# Patient Record
Sex: MALE | Race: WHITE | NOT HISPANIC OR LATINO
[De-identification: names, ages, dates, MRNs, and addresses within clinical notes are randomized per-mention and may not be internally consistent; named-entity substitution may affect disease eponyms.]

---

## 2022-04-05 ENCOUNTER — APPOINTMENT (OUTPATIENT)
Dept: ORTHOPEDIC SURGERY | Facility: CLINIC | Age: 62
End: 2022-04-05
Payer: OTHER MISCELLANEOUS

## 2022-04-05 PROBLEM — Z00.00 ENCOUNTER FOR PREVENTIVE HEALTH EXAMINATION: Status: ACTIVE | Noted: 2022-04-05

## 2022-04-05 PROCEDURE — 99072 ADDL SUPL MATRL&STAF TM PHE: CPT

## 2022-04-05 PROCEDURE — 20611 DRAIN/INJ JOINT/BURSA W/US: CPT | Mod: LT

## 2022-04-05 PROCEDURE — 99212 OFFICE O/P EST SF 10 MIN: CPT | Mod: PA,25

## 2022-04-05 NOTE — PROCEDURE
[Large Joint Injection] : Large joint injection [Left] : of the left [Knee] : knee [X-ray evidence of Osteoarthritis on this or prior visit] : x-ray evidence of Osteoarthritis on this or prior visit [Repeat series performed] : repeat series performed [Betadine] : betadine [Ethyl Chloride sprayed topically] : ethyl chloride sprayed topically [Sterile technique used] : sterile technique used [Orthovisc] : Orthovisc [#4] : series #4 [Call if redness, pain or fever occur] : call if redness, pain or fever occur [Apply ice for 15min out of every hour for the next 12-24 hours as tolerated] : apply ice for 15 minutes out of every hour for the next 12-24 hours as tolerated [Previous OTC use and PT nontherapeutic] : patient has tried OTC's including aspirin, Ibuprofen, Aleve, etc or prescription NSAIDS, and/or exercises at home and/or physical therapy without satisfactory response [Patient had decreased mobility in the joint] : patient had decreased mobility in the joint [Risks, benefits, alternatives discussed / Verbal consent obtained] : the risks benefits, and alternatives have been discussed, and verbal consent was obtained [Morbid obesity] : morbid obesity [All ultrasound images have been permanently captured and stored accordingly in our picture archiving and communication system] : All ultrasound images have been permanently captured and stored accordingly in our picture archiving and communication system [Visualization of the needle and placement of injection was performed without complication] : visualization of the needle and placement of injection was performed without complication

## 2022-04-05 NOTE — ASSESSMENT
[FreeTextEntry1] : Previous doc:\par Adv OA left knee. Failed conservative treatments. BMI 53 today (340 lbs) - needs to be < 300 lbs (BMI < 40 would be 250). Had lap band in 2003 and is open to exploring gastric sleeve.\par 4/30/19: Continued pain - working on getting date for bariatrics. For now cont duexis prn and will try tramadol.\par Cortisone inj in the past have only helped for a few days at a time.\par 5/28/19: Doing well with weight loss and planning for removal of gastric band and may need 2 stage for sleeve.\par 6/25/19: Stable for now - planning for bariatrics end of next month - he will be unable to work for a while due to this.\par 9/24/19: Cont pain - working on weight loss (293 lbs today). Injections in the past only a few days relief so will hold on this.\par 12/17/19: Worsening pain. Weight down to 280s now and BMI is < 45 - will get auth for TKA. Has psoriasis but never affecting left knee.\par 1/14/20: Cont difficulty and waiting for left TKA auth.\par 2/11/20: No change - TKA was denied but has appeal hearing in 2 weeks. Cont weight loss.\par 5/13/20: continues to work on weight loss. His Pain and loss of of function continues to progress. He has to take pain medication on a daily basis due how severe his pain is. At this time i believe the benefits outweigh the risks. He will continue on weight loss and our goal of TKA. Refilled his duexis.\par 8/11/20: No change - has deposition scheduled in Aug.\par 9/22/20: No change - currently 330 lbs and discussed need to be < 300 lbs for TKA. Awaiting auth. At one point was 280 lbs but gained some back during pandemic.\par 11/3/20: TKA authorized but unable to proceed as he is struggling with weight loss. BMI too high to proceed with\par surgery and needs to be < 300, ideally 280s to have TKA.\par 12/15/20: Continued pain - No change in weight. Will continue to work on weight loss and continue with home\par exercises. Cortisone injection today.\par 3/16/21: Still with significant pain - weight loss has not progressed and he has been around 345 the past few months -\par no overall loss from bariatrics. Not a TKA candidate at this time due to weight. Will try orthovisc again.\par 4/16/21: Inj tolerated well - asp 5cc.\par 4/27/21: Inj tolerated well.\par 5/3/21: Inj tolerated well.\par 5/11/21: Inj tolerated well.\par 6/8/21 he has slow wt loss down to 337 - dealing with neuropathy and DM -some relief with Inj and he is concerned he may be disabled\par y \par 7/20/21 he is unable to retrun to work due to pain but did have some relief with Knee inj - cont o struggle with wt loss\par 10/19/21: Symptoms unchanged, he has responded to orthovisc and will make another request as he can restart series early-mid November 2021. Weight loss discussed. He will continue OOW.\par 12/21/21: No change in symptoms. Will discuss "granted w prejudice" with WC as clarification is needed of what this means. f/up in 4 weeks. He will continue OOW\par 1/18/22: No change - will start HA injections when aurthorized. Only has granted w prejudice at this time.\par 3/1/22: No auth required to start orthovisc - he is going away next week and would like to start the series when he returns.\par 3/15/22: Inj tolerated well.\par \par 4/5/22: Inj tolerated well, reeval in 6 weeks.

## 2022-04-05 NOTE — WORK
[Total] : total [Does not reveal pre-existing condition(s) that may affect treatment/prognosis] : does not reveal pre-existing condition(s) that may affect treatment/prognosis [Cannot return to work because ________] : cannot return to work because [unfilled] [Unknown at this time] : : unknown at this time [Patient] : patient [No Rx restrictions] : No Rx restrictions. [I provided the services listed above] :  I provided the services listed above. [FreeTextEntry1] : guarded

## 2022-04-05 NOTE — HISTORY OF PRESENT ILLNESS
[4] : 4 [Dull/Aching] : dull/aching [de-identified] : 4/5/22: Orthovisc #4 left knee, some improvement overall.\par \par Previous doc:\par WC 2/27/13.\par Left knee pain for several years, fell at work sustaining meniscus tear. Prior to this had scope in 2007 with some relief.\par Over the years progressive worsening pain. had success in the past with HA and cortisone injections but this past year\par had less relief.. Cortisone inj 2 weeks ago only helped a few days. Lap band 2003 with 40 lb weight loss. Mult diets in\par the past always with  lb loss but then gains back.\par 4/30/19: Continued pain, started seeing bariatric surgeon and is on meal replacement plan. Down to 334 lbs.\par 5/28/19: Cont pain - lost another 10 lbs since last visit. Seeing bariatrics - may need 2 stage procedure to remove\par band and then get sleeve.\par 6/25/19: Cont pain - had to stop NSAIDs because of upcoming bariatric surgery. Taking Tylenol currently with very little\par relief. Surgery tentatively scheduled 7/24.\par 9/24/19: Cont pain. Had bariatic surgery in July.\par 12/9/19 Orthovisc #2 right knee.\par 12/17/19: Worsening pain - down to 287 lbs.\par 1/14/20: Cont pain, no change in weight.\par 2/11/20: TKA was denied, has hearing in 2 weeks. No change in weight recently.\par 5/13/20: Patient continues to having worsening pain. Has difficulty sleeping. His pain affects his daily life. Reports no\par change in weight since last visit. Would like a refill on his Duexis. Patient is not currently working due to COVID-19. \par 8/11/20: Cont pain, awaiting auth for TKA but deposition was rescheduled. now for 8/31?\par 9/22/20: No change - waiting for TKA auth.\par 11/3/20: Cont pain, TKA was authorized but struggling with weight loss.\par 12/15/20: Worsening left knee pain. Has been unsuccessful with weight loss. Started Ozympic this week.\par 3/16/21: Cont knee pain. Cortisone inj in Dec helped for 1-2 weeks.\par 4/16/21: Orthovisc #1 left knee.\par 4/27/21: Orthovisc #2 left knee.\par 5/4/21: Orthovisc #3 left knee.\par 5/11/21: Orthovisc #4 left knee.\par 6/8/21: Since last injection, he has had dull pain in left knee and does not have any clicking in left knee. Has been able\par to sleep at night and walk smoothly. knee still occ gives out on him \par 7/20 cont to have sig pain but inj helped - all medial pain -- \par 10/19/21: f/u LT knee, symptoms persist. No significant change. HA in the past with relief.\par 12/21/21: No change in symptoms from last visit.\par 1/18/22: Cont pain, no auth yet for HA injections.\par 3/1/22: Cont pain.\par 3/15/22: Orthovisc #1 left knee. [FreeTextEntry1] : left knee [de-identified] : none

## 2022-04-13 ENCOUNTER — APPOINTMENT (OUTPATIENT)
Dept: ORTHOPEDIC SURGERY | Facility: CLINIC | Age: 62
End: 2022-04-13
Payer: OTHER MISCELLANEOUS

## 2022-04-13 VITALS — HEIGHT: 67 IN | BODY MASS INDEX: 49.44 KG/M2 | WEIGHT: 315 LBS

## 2022-04-13 PROCEDURE — 99072 ADDL SUPL MATRL&STAF TM PHE: CPT

## 2022-04-13 PROCEDURE — 99213 OFFICE O/P EST LOW 20 MIN: CPT | Mod: 25

## 2022-04-13 PROCEDURE — 20611 DRAIN/INJ JOINT/BURSA W/US: CPT | Mod: RT

## 2022-04-13 NOTE — HISTORY OF PRESENT ILLNESS
[1] : 1 [Orthovisc] : Orthovisc [de-identified] : Patient returns today to start ORthovisc series for the right knee, history of post trauamatic OA. He has completed visco in the past with some help. PAin with walking, stairs, stiffness getting up from a seated position.  [] : This patient has had an injection before: no [de-identified] : right knee

## 2022-04-13 NOTE — WORK
[Total] : total [Does not reveal pre-existing condition(s) that may affect treatment/prognosis] : does not reveal pre-existing condition(s) that may affect treatment/prognosis [Cannot return to work because ________] : cannot return to work because [unfilled] [No] : No [No Rx restrictions] : No Rx restrictions. [I provided the services listed above] :  I provided the services listed above. [FreeTextEntry1] : guarded

## 2022-04-13 NOTE — PROCEDURE
[FreeTextEntry3] : Orthovisc (Large Joint) with Ultrasound Guidance\par Viscosupplementation Injection: X-ray evidence of Osteoarthritis on this or prior visit and Patient has tried OTC's including aspirin, Ibuprofen, Aleve etc or prescription NSAIDS, and/or exercises at home and/ or physical therapy without satisfactory response. \par An injection of Orthovisc 2ml #1 was injected into the right knee(s). after verbal consent using sterile technique. The risks, benefits, and alternatives to Viscosupplementation injection were explained in full to the patient. Risks outlined include but are not limited to infection, sepsis, bleeding, scarring, skin discoloration, temporary increase in pain, syncopal episode, failure to resolve symptoms, allergic reaction, and symptom recurrence. Signs and symptoms of infection reviewed and patient advised to call immediately for redness, fevers, and/or chills. Patient understood the risks. All questions were answered. After discussion of options, patient requested Viscosupplementation. Oral informed consent was obtained and sterile prep was done of the injection site. Sterile technique was used without complications. The patient tolerated the procedure well. Ice tonight to the injection site. \par \par Ultrasound Guidance was used for the following reasons: altered anatomic landmarks because of erosive arthritis. \par \par Ultrasound guided injection was performed of the knee, visualization of the needle and placement of injection was performed without complication. \par

## 2022-04-13 NOTE — DISCUSSION/SUMMARY
[Medication Risks Reviewed] : Medication risks reviewed [de-identified] : Tolerated injection well.\par Rest, ice, activity modification reviewed.\par REturn next week.

## 2022-04-13 NOTE — PHYSICAL EXAM
[Right] : right knee [5___] : hamstring 5[unfilled]/5 [] : no calf tenderness [TWNoteComboBox7] : flexion 120 degrees [de-identified] : extension 0 degrees

## 2022-04-14 ENCOUNTER — APPOINTMENT (OUTPATIENT)
Dept: ORTHOPEDIC SURGERY | Facility: CLINIC | Age: 62
End: 2022-04-14
Payer: OTHER MISCELLANEOUS

## 2022-04-14 VITALS — WEIGHT: 315 LBS | HEIGHT: 67 IN | BODY MASS INDEX: 49.44 KG/M2

## 2022-04-14 PROCEDURE — 99072 ADDL SUPL MATRL&STAF TM PHE: CPT

## 2022-04-14 PROCEDURE — 99214 OFFICE O/P EST MOD 30 MIN: CPT

## 2022-04-14 NOTE — DISCUSSION/SUMMARY
[Medication Risks Reviewed] : Medication risks reviewed [de-identified] : Please authorize PT program for the right shoulder.\par frequency of CSI discussed.\par

## 2022-04-14 NOTE — HISTORY OF PRESENT ILLNESS
[7] : 7 [0] : 0 [de-identified] : DOA 1/15/18\par \par pt is here today for WC follow up for the right shoulder. pt states that his level of pain is the same as last visit. pt still is having issues with lifting his arm up, behind back, sleeping. Pain affects ADL's.  [FreeTextEntry1] : right shoulder [de-identified] : home exercises

## 2022-04-14 NOTE — PHYSICAL EXAM
[Right] : right shoulder [Sitting] : sitting [Mild] : mild [] : decreased sensation around incision [TWNoteComboBox7] : active forward flexion 140 degrees [TWNoteComboBox6] : internal rotation L5 [de-identified] : external rotation 50 degrees

## 2022-04-14 NOTE — WORK
[Total] : total [Does not reveal pre-existing condition(s) that may affect treatment/prognosis] : does not reveal pre-existing condition(s) that may affect treatment/prognosis [Cannot return to work because ________] : cannot return to work because [unfilled] [Unknown at this time] : : unknown at this time [Patient] : patient [No Rx restrictions] : No Rx restrictions. [I provided the services listed above] :  I provided the services listed above. [FreeTextEntry1] : poor

## 2022-04-20 ENCOUNTER — APPOINTMENT (OUTPATIENT)
Dept: ORTHOPEDIC SURGERY | Facility: CLINIC | Age: 62
End: 2022-04-20
Payer: OTHER MISCELLANEOUS

## 2022-04-20 ENCOUNTER — APPOINTMENT (OUTPATIENT)
Dept: ORTHOPEDIC SURGERY | Facility: CLINIC | Age: 62
End: 2022-04-20

## 2022-04-20 VITALS — BODY MASS INDEX: 49.44 KG/M2 | HEIGHT: 67 IN | WEIGHT: 315 LBS

## 2022-04-20 PROCEDURE — 99072 ADDL SUPL MATRL&STAF TM PHE: CPT

## 2022-04-20 PROCEDURE — 99213 OFFICE O/P EST LOW 20 MIN: CPT | Mod: 25

## 2022-04-20 PROCEDURE — 20611 DRAIN/INJ JOINT/BURSA W/US: CPT | Mod: RT

## 2022-04-20 NOTE — DISCUSSION/SUMMARY
[Medication Risks Reviewed] : Medication risks reviewed [de-identified] : Patient allowed to gently start resuming activities. \par Discussed change to medication prescription and usage. \par Activity modification as needed\par

## 2022-04-20 NOTE — HISTORY OF PRESENT ILLNESS
[2] : 2 [Orthovisc] : Orthovisc [de-identified] : pt is here today to continue his gel injection series #2 to the right knee. [] : no [de-identified] : 04/13/2022 [de-identified] : right knee

## 2022-04-20 NOTE — PHYSICAL EXAM
[Right] : right knee [TWNoteComboBox6] : internal rotation L5 [de-identified] : external rotation 50 degrees [] : no ecchymosis [TWNoteComboBox7] : flexion 120 degrees

## 2022-04-27 ENCOUNTER — APPOINTMENT (OUTPATIENT)
Dept: ORTHOPEDIC SURGERY | Facility: CLINIC | Age: 62
End: 2022-04-27
Payer: OTHER MISCELLANEOUS

## 2022-04-27 VITALS — BODY MASS INDEX: 49.44 KG/M2 | HEIGHT: 67 IN | WEIGHT: 315 LBS

## 2022-04-27 PROCEDURE — 20611 DRAIN/INJ JOINT/BURSA W/US: CPT | Mod: RT

## 2022-04-27 PROCEDURE — 99072 ADDL SUPL MATRL&STAF TM PHE: CPT

## 2022-04-27 PROCEDURE — 99213 OFFICE O/P EST LOW 20 MIN: CPT | Mod: 25

## 2022-04-27 NOTE — DISCUSSION/SUMMARY
[Medication Risks Reviewed] : Medication risks reviewed [de-identified] : Patient allowed to gently start resuming activities. \par Discussed change to medication prescription and usage. \par Activity modification as needed\par

## 2022-04-27 NOTE — HISTORY OF PRESENT ILLNESS
[Orthovisc] : Orthovisc [3] : 3 [de-identified] : pt is here today to continue his gel injection series #3 to the right knee. [] : no [de-identified] : 04/20/2022 [de-identified] : right knee

## 2022-04-27 NOTE — PROCEDURE
[FreeTextEntry3] : Orthovisc (Large Joint) with Ultrasound Guidance\par Viscosupplementation Injection: X-ray evidence of Osteoarthritis on this or prior visit and Patient has tried OTC's including aspirin, Ibuprofen, Aleve etc or prescription NSAIDS, and/or exercises at home and/ or physical therapy without satisfactory response. \par An injection of Orthovisc 2ml #3 was injected into the right knee(s). after verbal consent using sterile technique. The risks, benefits, and alternatives to Viscosupplementation injection were explained in full to the patient. Risks outlined include but are not limited to infection, sepsis, bleeding, scarring, skin discoloration, temporary increase in pain, syncopal episode, failure to resolve symptoms, allergic reaction, and symptom recurrence. Signs and symptoms of infection reviewed and patient advised to call immediately for redness, fevers, and/or chills. Patient understood the risks. All questions were answered. After discussion of options, patient requested Viscosupplementation. Oral informed consent was obtained and sterile prep was done of the injection site. Sterile technique was used without complications. The patient tolerated the procedure well. Ice tonight to the injection site. \par \par Ultrasound Guidance was used for the following reasons: altered anatomic landmarks because of erosive arthritis. \par \par Ultrasound guided injection was performed of the knee, visualization of the needle and placement of injection was performed without complication. \par

## 2022-05-04 ENCOUNTER — APPOINTMENT (OUTPATIENT)
Dept: ORTHOPEDIC SURGERY | Facility: CLINIC | Age: 62
End: 2022-05-04
Payer: OTHER MISCELLANEOUS

## 2022-05-04 VITALS — HEIGHT: 67 IN | BODY MASS INDEX: 49.44 KG/M2 | WEIGHT: 315 LBS

## 2022-05-04 PROCEDURE — 99213 OFFICE O/P EST LOW 20 MIN: CPT | Mod: 25

## 2022-05-04 PROCEDURE — 99072 ADDL SUPL MATRL&STAF TM PHE: CPT

## 2022-05-04 PROCEDURE — 20611 DRAIN/INJ JOINT/BURSA W/US: CPT

## 2022-05-04 NOTE — PROCEDURE
[FreeTextEntry3] : Orthovisc (Large Joint) with Ultrasound Guidance\par Viscosupplementation Injection: X-ray evidence of Osteoarthritis on this or prior visit and Patient has tried OTC's including aspirin, Ibuprofen, Aleve etc or prescription NSAIDS, and/or exercises at home and/ or physical therapy without satisfactory response. \par An injection of Orthovisc 2ml #4 was injected into the right knee(s). after verbal consent using sterile technique. The risks, benefits, and alternatives to Viscosupplementation injection were explained in full to the patient. Risks outlined include but are not limited to infection, sepsis, bleeding, scarring, skin discoloration, temporary increase in pain, syncopal episode, failure to resolve symptoms, allergic reaction, and symptom recurrence. Signs and symptoms of infection reviewed and patient advised to call immediately for redness, fevers, and/or chills. Patient understood the risks. All questions were answered. After discussion of options, patient requested Viscosupplementation. Oral informed consent was obtained and sterile prep was done of the injection site. Sterile technique was used without complications. The patient tolerated the procedure well. Ice tonight to the injection site. \par \par Ultrasound Guidance was used for the following reasons: altered anatomic landmarks because of erosive arthritis. \par \par Ultrasound guided injection was performed of the knee, visualization of the needle and placement of injection was performed without complication. \par

## 2022-05-04 NOTE — DISCUSSION/SUMMARY
[Medication Risks Reviewed] : Medication risks reviewed [de-identified] : Patient allowed to gently start resuming activities. \par Discussed change to medication prescription and usage. \par Activity modification as needed\par

## 2022-05-04 NOTE — HISTORY OF PRESENT ILLNESS
[Orthovisc] : Orthovisc [de-identified] : pt is here today to continue his gel injection series #4 to the right knee. [3] : 3 [] : no [de-identified] : 04/20/2022 [de-identified] : right knee

## 2022-05-04 NOTE — PHYSICAL EXAM
[Right] : right knee [] : anterior tenderness [FreeTextEntry8] : sl [TWNoteComboBox7] : flexion 120 degrees

## 2022-05-17 ENCOUNTER — APPOINTMENT (OUTPATIENT)
Dept: ORTHOPEDIC SURGERY | Facility: CLINIC | Age: 62
End: 2022-05-17
Payer: OTHER MISCELLANEOUS

## 2022-05-17 VITALS — HEIGHT: 67 IN | BODY MASS INDEX: 49.44 KG/M2 | WEIGHT: 315 LBS

## 2022-05-17 DIAGNOSIS — E78.00 PURE HYPERCHOLESTEROLEMIA, UNSPECIFIED: ICD-10-CM

## 2022-05-17 DIAGNOSIS — E11.9 TYPE 2 DIABETES MELLITUS W/OUT COMPLICATIONS: ICD-10-CM

## 2022-05-17 PROCEDURE — 20611 DRAIN/INJ JOINT/BURSA W/US: CPT | Mod: LT

## 2022-05-17 PROCEDURE — 99072 ADDL SUPL MATRL&STAF TM PHE: CPT

## 2022-05-17 PROCEDURE — 99214 OFFICE O/P EST MOD 30 MIN: CPT | Mod: 25

## 2022-05-17 PROCEDURE — J3490M: CUSTOM

## 2022-05-17 NOTE — ASSESSMENT
[FreeTextEntry1] : Previous doc:\par Adv OA left knee. Failed conservative treatments. BMI 53 today (340 lbs) - needs to be < 300 lbs (BMI < 40 would be 250). Had lap band in 2003 and is open to exploring gastric sleeve.\par 4/30/19: Continued pain - working on getting date for bariatrics. For now cont duexis prn and will try tramadol.\par Cortisone inj in the past have only helped for a few days at a time.\par 5/28/19: Doing well with weight loss and planning for removal of gastric band and may need 2 stage for sleeve.\par 6/25/19: Stable for now - planning for bariatrics end of next month - he will be unable to work for a while due to this.\par 9/24/19: Cont pain - working on weight loss (293 lbs today). Injections in the past only a few days relief so will hold on this.\par 12/17/19: Worsening pain. Weight down to 280s now and BMI is < 45 - will get auth for TKA. Has psoriasis but never affecting left knee.\par 1/14/20: Cont difficulty and waiting for left TKA auth.\par 2/11/20: No change - TKA was denied but has appeal hearing in 2 weeks. Cont weight loss.\par 5/13/20: continues to work on weight loss. His Pain and loss of of function continues to progress. He has to take pain medication on a daily basis due how severe his pain is. At this time i believe the benefits outweigh the risks. He will continue on weight loss and our goal of TKA. Refilled his duexis.\par 8/11/20: No change - has deposition scheduled in Aug.\par 9/22/20: No change - currently 330 lbs and discussed need to be < 300 lbs for TKA. Awaiting auth. At one point was 280 lbs but gained some back during pandemic.\par 11/3/20: TKA authorized but unable to proceed as he is struggling with weight loss. BMI too high to proceed with\par surgery and needs to be < 300, ideally 280s to have TKA.\par 12/15/20: Continued pain - No change in weight. Will continue to work on weight loss and continue with home\par exercises. Cortisone injection today.\par 3/16/21: Still with significant pain - weight loss has not progressed and he has been around 345 the past few months -\par no overall loss from bariatrics. Not a TKA candidate at this time due to weight. Will try orthovisc again.\par 4/16/21: Inj tolerated well - asp 5cc.\par 4/27/21: Inj tolerated well.\par 5/3/21: Inj tolerated well.\par 5/11/21: Inj tolerated well.\par 6/8/21 he has slow wt loss down to 337 - dealing with neuropathy and DM -some relief with Inj and he is concerned he may be disabled\par y \par 7/20/21 he is unable to retrun to work due to pain but did have some relief with Knee inj - cont o struggle with wt loss\par 10/19/21: Symptoms unchanged, he has responded to orthovisc and will make another request as he can restart series early-mid November 2021. Weight loss discussed. He will continue OOW.\par 12/21/21: No change in symptoms. Will discuss "granted w prejudice" with WC as clarification is needed of what this means. f/up in 4 weeks. He will continue OOW\par 1/18/22: No change - will start HA injections when aurthorized. Only has granted w prejudice at this time.\par 3/1/22: No auth required to start orthovisc - he is going away next week and would like to start the series when he returns.\par 3/15/22: Inj tolerated well.\par 4/5/22: Inj tolerated well, reeval in 6 weeks.\par \par 5/17/22: HA injections helped but still with significant pain levels - cortisone inj today tolerated well.  He is very frustrated with this situation and that he has had such difficulty with weight loss.

## 2022-05-17 NOTE — HISTORY OF PRESENT ILLNESS
[Sudden] : sudden [8] : 8 [4] : 4 [Dull/Aching] : dull/aching [Localized] : localized [Sharp] : sharp [Not working due to injury] : Work status: not working due to injury [] : Post Surgical Visit: no [FreeTextEntry1] : L Knee [FreeTextEntry3] : N/A Chronic  [FreeTextEntry5] : pt is a 60 y/o male in for eval of the L Knee pt states NKI pt states pain is chronic pt states wthis is WC DOI 2/27/13 [de-identified] : Orthovisc Series Completed 4/5/22 [de-identified] :   [de-identified] : 5/17/22: HA injections helped but still has pain.\par \par Previous doc:\par WC 2/27/13.\par Left knee pain for several years, fell at work sustaining meniscus tear. Prior to this had scope in 2007 with some relief.\par Over the years progressive worsening pain. had success in the past with HA and cortisone injections but this past year\par had less relief.. Cortisone inj 2 weeks ago only helped a few days. Lap band 2003 with 40 lb weight loss. Mult diets in\par the past always with  lb loss but then gains back.\par 4/30/19: Continued pain, started seeing bariatric surgeon and is on meal replacement plan. Down to 334 lbs.\par 5/28/19: Cont pain - lost another 10 lbs since last visit. Seeing bariatrics - may need 2 stage procedure to remove\par band and then get sleeve.\par 6/25/19: Cont pain - had to stop NSAIDs because of upcoming bariatric surgery. Taking Tylenol currently with very little\par relief. Surgery tentatively scheduled 7/24.\par 9/24/19: Cont pain. Had bariatic surgery in July.\par 12/9/19 Orthovisc #2 right knee.\par 12/17/19: Worsening pain - down to 287 lbs.\par 1/14/20: Cont pain, no change in weight.\par 2/11/20: TKA was denied, has hearing in 2 weeks. No change in weight recently.\par 5/13/20: Patient continues to having worsening pain. Has difficulty sleeping. His pain affects his daily life. Reports no\par change in weight since last visit. Would like a refill on his Duexis. Patient is not currently working due to COVID-19. \par 8/11/20: Cont pain, awaiting auth for TKA but deposition was rescheduled. now for 8/31?\par 9/22/20: No change - waiting for TKA auth.\par 11/3/20: Cont pain, TKA was authorized but struggling with weight loss.\par 12/15/20: Worsening left knee pain. Has been unsuccessful with weight loss. Started Ozympic this week.\par 3/16/21: Cont knee pain. Cortisone inj in Dec helped for 1-2 weeks.\par 4/16/21: Orthovisc #1 left knee.\par 4/27/21: Orthovisc #2 left knee.\par 5/4/21: Orthovisc #3 left knee.\par 5/11/21: Orthovisc #4 left knee.\par 6/8/21: Since last injection, he has had dull pain in left knee and does not have any clicking in left knee. Has been able\par to sleep at night and walk smoothly. knee still occ gives out on him \par 7/20 cont to have sig pain but inj helped - all medial pain -- \par 10/19/21: f/u LT knee, symptoms persist. No significant change. HA in the past with relief.\par 12/21/21: No change in symptoms from last visit.\par 1/18/22: Cont pain, no auth yet for HA injections.\par 3/1/22: Cont pain.\par 3/15/22: Orthovisc #1 left knee.\par 4/5/22: Orthovisc #4 left knee, some improvement overall.

## 2022-05-17 NOTE — IMAGING
[de-identified] : Left knee:\par pain med and lateral joint line \par Crepitus\par NIVI\par Strenth 5/5\par Pulses +2 DP/PT\par Decreased ROM -\par

## 2022-05-17 NOTE — DISCUSSION/SUMMARY
[de-identified] : The patient was advised of the diagnosis.  The natural history of the pathology was explained in full to the patient in layman's terms. All questions were answered.  The risks and benefits of surgical and non-surgical treatment alternatives were explained in full to the patient.\par

## 2022-05-17 NOTE — PROCEDURE
[FreeTextEntry3] : Large joint injection was performed on the _____ knee. The indication for this procedure was pain, inflammation, and x-ray evidence of Osteoarthritis on this or prior visit. The site was prepped with betadine, ethyl chloride sprayed topically, and sterile technique used. An injection of Lidocaine 3cc of 1% , Bupivacaine (Marcaine) 5cc of 0.25% , Methylprednisolone (Depomedrol) cc of 80 mg was used. Patient was advised to call if redness, pain, or fever occur, apply ice for 15 minutes out of every hour for the next 12-24 hours as tolerated and patient was advised to rest the joint(s) for days.\par Patient has tried OTC's including aspirin, Ibuprofen, Aleve, etc or prescription NSAIDS, and/or exercises at home and/or physical therapy without satisfactory response and patient had decreased mobility in the joint. Ultrasound guidance was indicated for this patient due to better visualize joint space. All ultrasound images have been permanently captured and stored accordingly in our picture.\par

## 2022-05-26 ENCOUNTER — APPOINTMENT (OUTPATIENT)
Dept: ORTHOPEDIC SURGERY | Facility: CLINIC | Age: 62
End: 2022-05-26
Payer: OTHER MISCELLANEOUS

## 2022-05-26 VITALS — WEIGHT: 315 LBS | BODY MASS INDEX: 49.44 KG/M2 | HEIGHT: 67 IN

## 2022-05-26 PROCEDURE — 99214 OFFICE O/P EST MOD 30 MIN: CPT

## 2022-05-26 PROCEDURE — 99072 ADDL SUPL MATRL&STAF TM PHE: CPT

## 2022-05-26 NOTE — DISCUSSION/SUMMARY
[de-identified] : Patient allowed to gently start resuming activities. \par Discussed change to medication prescription and usage. t. \par Activity modification as needed\par

## 2022-05-26 NOTE — WORK
[Total] : total [Does not reveal pre-existing condition(s) that may affect treatment/prognosis] : does not reveal pre-existing condition(s) that may affect treatment/prognosis [Cannot return to work because ________] : cannot return to work because [unfilled] [Patient] : patient [No Rx restrictions] : No Rx restrictions. [I provided the services listed above] :  I provided the services listed above. [FreeTextEntry1] : fair

## 2022-05-26 NOTE — HISTORY OF PRESENT ILLNESS
[5] : 5 [2] : 2 [Not working due to injury] : Work status: not working due to injury [] : yes [de-identified] : pt is here today for a WC follow up of his right knee. pt states his pain is better than last visit. pt finished his gel injection series on 05/04/2022, he is not working [FreeTextEntry1] : right knee [de-identified] : none

## 2022-06-22 ENCOUNTER — APPOINTMENT (OUTPATIENT)
Dept: ORTHOPEDIC SURGERY | Facility: CLINIC | Age: 62
End: 2022-06-22
Payer: OTHER MISCELLANEOUS

## 2022-06-22 VITALS — WEIGHT: 315 LBS | HEIGHT: 67 IN | BODY MASS INDEX: 49.44 KG/M2

## 2022-06-22 PROCEDURE — 99072 ADDL SUPL MATRL&STAF TM PHE: CPT

## 2022-06-22 PROCEDURE — 99214 OFFICE O/P EST MOD 30 MIN: CPT

## 2022-06-22 NOTE — DISCUSSION/SUMMARY
[Medication Risks Reviewed] : Medication risks reviewed [de-identified] : Patient allowed to gently start resuming activities. \par Discussed change to medication prescription and usage. \par Activity modification as needed\par

## 2022-06-22 NOTE — HISTORY OF PRESENT ILLNESS
[5] : 5 [2] : 2 [Dull/Aching] : dull/aching [Not working due to injury] : Work status: not working due to injury [de-identified] : pt completed orthovisc to the right knee.5/4 with help and the weather is helping, from [place] 1/13/14 [3] : 3 [Orthovisc] : Orthovisc [] : Post Surgical Visit: no [FreeTextEntry1] : right knee  [de-identified] : none  [de-identified] : s [de-identified] : 04/20/2022 [de-identified] : right knee

## 2022-06-22 NOTE — PHYSICAL EXAM
[Right] : right knee [] : not mildly antalgic [FreeTextEntry8] : sl [TWNoteComboBox7] : flexion 120 degrees

## 2022-07-14 ENCOUNTER — APPOINTMENT (OUTPATIENT)
Dept: ORTHOPEDIC SURGERY | Facility: CLINIC | Age: 62
End: 2022-07-14

## 2022-07-14 VITALS — HEIGHT: 67 IN | WEIGHT: 315 LBS | BODY MASS INDEX: 49.44 KG/M2

## 2022-07-14 PROCEDURE — 99214 OFFICE O/P EST MOD 30 MIN: CPT

## 2022-07-14 PROCEDURE — 99072 ADDL SUPL MATRL&STAF TM PHE: CPT

## 2022-07-14 NOTE — PHYSICAL EXAM
[Right] : right shoulder [Sitting] : sitting [Mild] : mild [] : decreased sensation around incision [TWNoteComboBox7] : active forward flexion 140 degrees [TWNoteComboBox6] : internal rotation L5 [de-identified] : external rotation 50 degrees

## 2022-07-14 NOTE — DISCUSSION/SUMMARY
[Medication Risks Reviewed] : Medication risks reviewed [de-identified] : Please authorize PT program for the right shoulder.\par frequency of CSI discussed.\par Patient allowed to gently start resuming activities. \par Discussed change to medication prescription and usage. \par Offered cortisone steroid injection. \par \par

## 2022-07-14 NOTE — HISTORY OF PRESENT ILLNESS
[7] : 7 [Burning] : burning [Stabbing] : stabbing [Constant] : constant [Rest] : rest [Meds] : meds [Walking] : walking [de-identified] : DOA 1/15/18\par \par pt is here today for WC follow up for the right shoulder. pt states that his level of pain is the same as last visit. pt still is having issues with lifting his arm up, behind back, sleeping. Pain affects ADL's.  [0] : 0 [] : no [FreeTextEntry1] : right shoulder [de-identified] : home exercises

## 2022-08-03 ENCOUNTER — APPOINTMENT (OUTPATIENT)
Dept: ORTHOPEDIC SURGERY | Facility: CLINIC | Age: 62
End: 2022-08-03

## 2022-08-03 VITALS — HEIGHT: 67 IN | BODY MASS INDEX: 49.44 KG/M2 | WEIGHT: 315 LBS

## 2022-08-03 PROCEDURE — 99213 OFFICE O/P EST LOW 20 MIN: CPT

## 2022-08-03 PROCEDURE — 99072 ADDL SUPL MATRL&STAF TM PHE: CPT

## 2022-08-03 NOTE — HISTORY OF PRESENT ILLNESS
[6] : 6 [2] : 2 [] : yes [de-identified] : pt is here today for WC follow up for the right knee. pt states his pain is the same as last visit, gel injections helped some, completed 5/4/22. He recently had to stop NSAIDS secondary to diverticulosis, symptoms are feeling a little worse.   [FreeTextEntry1] : right knee  [de-identified] : home exercises

## 2022-08-03 NOTE — WORK
[Total] : total [Does not reveal pre-existing condition(s) that may affect treatment/prognosis] : does not reveal pre-existing condition(s) that may affect treatment/prognosis [Cannot return to work because ________] : cannot return to work because [unfilled] [Unknown at this time] : : unknown at this time [No Rx restrictions] : No Rx restrictions. [I provided the services listed above] :  I provided the services listed above. [FreeTextEntry1] : guarded

## 2022-09-08 ENCOUNTER — APPOINTMENT (OUTPATIENT)
Dept: ORTHOPEDIC SURGERY | Facility: CLINIC | Age: 62
End: 2022-09-08

## 2022-09-08 VITALS — BODY MASS INDEX: 49.44 KG/M2 | WEIGHT: 315 LBS | HEIGHT: 67 IN

## 2022-09-08 PROCEDURE — 99214 OFFICE O/P EST MOD 30 MIN: CPT

## 2022-09-08 PROCEDURE — 99072 ADDL SUPL MATRL&STAF TM PHE: CPT

## 2022-09-08 NOTE — DISCUSSION/SUMMARY
[Medication Risks Reviewed] : Medication risks reviewed [de-identified] : \par Patient allowed to gently start resuming activities. \par Discussed change to medication prescription and usage. \par \par \par

## 2022-09-08 NOTE — PHYSICAL EXAM
[Right] : right shoulder [Sitting] : sitting [Mild] : mild [] : decreased sensation around incision [TWNoteComboBox7] : active forward flexion 140 degrees [TWNoteComboBox6] : internal rotation L5 [de-identified] : external rotation 50 degrees

## 2022-09-08 NOTE — HISTORY OF PRESENT ILLNESS
[de-identified] : DOA 1/15/18\par \par pt is here today for WC follow up for the right shoulder. pt states that his level of pain is the same as last visit. pt still is having issues with lifting his arm up, behind back, sleeping. Pain affects ADL's. The meds do help [7] : 7 [0] : 0 [Burning] : burning [Stabbing] : stabbing [Constant] : constant [Rest] : rest [Meds] : meds [Walking] : walking [] : no [FreeTextEntry1] : right shoulder [de-identified] : home exercises

## 2022-09-20 ENCOUNTER — APPOINTMENT (OUTPATIENT)
Dept: ORTHOPEDIC SURGERY | Facility: CLINIC | Age: 62
End: 2022-09-20

## 2022-09-20 VITALS — WEIGHT: 315 LBS | BODY MASS INDEX: 49.44 KG/M2 | HEIGHT: 67 IN

## 2022-09-20 PROCEDURE — 20611 DRAIN/INJ JOINT/BURSA W/US: CPT | Mod: ACP

## 2022-09-20 PROCEDURE — 99214 OFFICE O/P EST MOD 30 MIN: CPT | Mod: ACP,25

## 2022-09-20 PROCEDURE — 99072 ADDL SUPL MATRL&STAF TM PHE: CPT | Mod: ACP

## 2022-09-20 PROCEDURE — J3490M: CUSTOM | Mod: ACP

## 2022-09-20 NOTE — ASSESSMENT
[FreeTextEntry1] : Previous doc:\par Adv OA left knee. Failed conservative treatments. BMI 53 today (340 lbs) - needs to be < 300 lbs (BMI < 40 would be 250). Had lap band in 2003 and is open to exploring gastric sleeve.\par 4/30/19: Continued pain - working on getting date for bariatrics. For now cont duexis prn and will try tramadol.\par Cortisone inj in the past have only helped for a few days at a time.\par 5/28/19: Doing well with weight loss and planning for removal of gastric band and may need 2 stage for sleeve.\par 6/25/19: Stable for now - planning for bariatrics end of next month - he will be unable to work for a while due to this.\par 9/24/19: Cont pain - working on weight loss (293 lbs today). Injections in the past only a few days relief so will hold on this.\par 12/17/19: Worsening pain. Weight down to 280s now and BMI is < 45 - will get auth for TKA. Has psoriasis but never affecting left knee.\par 1/14/20: Cont difficulty and waiting for left TKA auth.\par 2/11/20: No change - TKA was denied but has appeal hearing in 2 weeks. Cont weight loss.\par 5/13/20: continues to work on weight loss. His Pain and loss of of function continues to progress. He has to take pain medication on a daily basis due how severe his pain is. At this time i believe the benefits outweigh the risks. He will continue on weight loss and our goal of TKA. Refilled his duexis.\par 8/11/20: No change - has deposition scheduled in Aug.\par 9/22/20: No change - currently 330 lbs and discussed need to be < 300 lbs for TKA. Awaiting auth. At one point was 280 lbs but gained some back during pandemic.\par 11/3/20: TKA authorized but unable to proceed as he is struggling with weight loss. BMI too high to proceed with\par surgery and needs to be < 300, ideally 280s to have TKA.\par 12/15/20: Continued pain - No change in weight. Will continue to work on weight loss and continue with home\par exercises. Cortisone injection today.\par 3/16/21: Still with significant pain - weight loss has not progressed and he has been around 345 the past few months -\par no overall loss from bariatrics. Not a TKA candidate at this time due to weight. Will try orthovisc again.\par 4/16/21: Inj tolerated well - asp 5cc.\par 4/27/21: Inj tolerated well.\par 5/3/21: Inj tolerated well.\par 5/11/21: Inj tolerated well.\par 6/8/21 he has slow wt loss down to 337 - dealing with neuropathy and DM -some relief with Inj and he is concerned he may be disabled\par y \par 7/20/21 he is unable to retrun to work due to pain but did have some relief with Knee inj - cont o struggle with wt loss\par 10/19/21: Symptoms unchanged, he has responded to orthovisc and will make another request as he can restart series early-mid November 2021. Weight loss discussed. He will continue OOW.\par 12/21/21: No change in symptoms. Will discuss "granted w prejudice" with WC as clarification is needed of what this means. f/up in 4 weeks. He will continue OOW\par 1/18/22: No change - will start HA injections when aurthorized. Only has granted w prejudice at this time.\par 3/1/22: No auth required to start orthovisc - he is going away next week and would like to start the series when he returns.\par 3/15/22: Inj tolerated well.\par 4/5/22: Inj tolerated well, reeval in 6 weeks.\par 5/17/22: HA injections helped but still with significant pain levels - cortisone inj today tolerated well.  He is very frustrated with this situation and that he has had such difficulty with weight loss.\par \par 9/20/22: Repeat cortisone inj today tolreated well, will reeval in 1 month for possible HA injections again.

## 2022-09-20 NOTE — HISTORY OF PRESENT ILLNESS
[Work related] : work related [7] : 7 [3] : 3 [Dull/Aching] : dull/aching [de-identified] : 9/20/22: Had about 4 months relief from CSI  \par \par Previous doc:\par WC 2/27/13.\par Left knee pain for several years, fell at work sustaining meniscus tear. Prior to this had scope in 2007 with some relief.\par Over the years progressive worsening pain. had success in the past with HA and cortisone injections but this past year\par had less relief.. Cortisone inj 2 weeks ago only helped a few days. Lap band 2003 with 40 lb weight loss. Mult diets in\par the past always with  lb loss but then gains back.\par 4/30/19: Continued pain, started seeing bariatric surgeon and is on meal replacement plan. Down to 334 lbs.\par 5/28/19: Cont pain - lost another 10 lbs since last visit. Seeing bariatrics - may need 2 stage procedure to remove\par band and then get sleeve.\par 6/25/19: Cont pain - had to stop NSAIDs because of upcoming bariatric surgery. Taking Tylenol currently with very little\par relief. Surgery tentatively scheduled 7/24.\par 9/24/19: Cont pain. Had bariatic surgery in July.\par 12/9/19 Orthovisc #2 right knee.\par 12/17/19: Worsening pain - down to 287 lbs.\par 1/14/20: Cont pain, no change in weight.\par 2/11/20: TKA was denied, has hearing in 2 weeks. No change in weight recently.\par 5/13/20: Patient continues to having worsening pain. Has difficulty sleeping. His pain affects his daily life. Reports no\par change in weight since last visit. Would like a refill on his Duexis. Patient is not currently working due to COVID-19. \par 8/11/20: Cont pain, awaiting auth for TKA but deposition was rescheduled. now for 8/31?\par 9/22/20: No change - waiting for TKA auth.\par 11/3/20: Cont pain, TKA was authorized but struggling with weight loss.\par 12/15/20: Worsening left knee pain. Has been unsuccessful with weight loss. Started Ozympic this week.\par 3/16/21: Cont knee pain. Cortisone inj in Dec helped for 1-2 weeks.\par 4/16/21: Orthovisc #1 left knee.\par 4/27/21: Orthovisc #2 left knee.\par 5/4/21: Orthovisc #3 left knee.\par 5/11/21: Orthovisc #4 left knee.\par 6/8/21: Since last injection, he has had dull pain in left knee and does not have any clicking in left knee. Has been able\par to sleep at night and walk smoothly. knee still occ gives out on him \par 7/20 cont to have sig pain but inj helped - all medial pain -- \par 10/19/21: f/u LT knee, symptoms persist. No significant change. HA in the past with relief.\par 12/21/21: No change in symptoms from last visit.\par 1/18/22: Cont pain, no auth yet for HA injections.\par 3/1/22: Cont pain.\par 3/15/22: Orthovisc #1 left knee.\par 4/5/22: Orthovisc #4 left knee, some improvement overall.\par 5/17/22: HA injections helped but still has pain. [FreeTextEntry3] : 2/[birth year]13

## 2022-09-20 NOTE — IMAGING
[de-identified] : Left knee:\par pain med and lateral joint line \par Crepitus\par NIVI\par Strenth 5/5\par Pulses +2 DP/PT\par Decreased ROM -\par

## 2022-09-21 ENCOUNTER — APPOINTMENT (OUTPATIENT)
Dept: ORTHOPEDIC SURGERY | Facility: CLINIC | Age: 62
End: 2022-09-21

## 2022-09-21 VITALS — BODY MASS INDEX: 49.44 KG/M2 | HEIGHT: 67 IN | WEIGHT: 315 LBS

## 2022-09-21 PROCEDURE — 99072 ADDL SUPL MATRL&STAF TM PHE: CPT

## 2022-09-21 PROCEDURE — 99214 OFFICE O/P EST MOD 30 MIN: CPT

## 2022-09-21 NOTE — WORK
[Total] : total [Does not reveal pre-existing condition(s) that may affect treatment/prognosis] : does not reveal pre-existing condition(s) that may affect treatment/prognosis [Cannot return to work because ________] : cannot return to work because [unfilled] [Unknown at this time] : : unknown at this time [No Rx restrictions] : No Rx restrictions. [I provided the services listed above] :  I provided the services listed above. [FreeTextEntry1] : poor

## 2022-09-21 NOTE — HISTORY OF PRESENT ILLNESS
[6] : 6 [de-identified] : pt is here today for WC follow up for the right knee. pt states his pain is the same as last visit, gel injections helped some, completed 5/4/22. He recently had to stop NSAIDS secondary to diverticulosis, symptoms are feeling a little worse.   [2] : 2 [] : yes [FreeTextEntry1] : right knee  [de-identified] : home exercises

## 2022-10-07 ENCOUNTER — RX RENEWAL (OUTPATIENT)
Age: 62
End: 2022-10-07

## 2022-10-10 ENCOUNTER — RX RENEWAL (OUTPATIENT)
Age: 62
End: 2022-10-10

## 2022-10-18 ENCOUNTER — APPOINTMENT (OUTPATIENT)
Dept: ORTHOPEDIC SURGERY | Facility: CLINIC | Age: 62
End: 2022-10-18

## 2022-10-18 ENCOUNTER — NON-APPOINTMENT (OUTPATIENT)
Age: 62
End: 2022-10-18

## 2022-10-18 VITALS — WEIGHT: 315 LBS | HEIGHT: 67 IN | BODY MASS INDEX: 49.44 KG/M2

## 2022-10-18 PROCEDURE — 99072 ADDL SUPL MATRL&STAF TM PHE: CPT | Mod: ACP

## 2022-10-18 PROCEDURE — 99214 OFFICE O/P EST MOD 30 MIN: CPT | Mod: ACP

## 2022-10-18 NOTE — DISCUSSION/SUMMARY
[de-identified] : The patient was advised of the diagnosis.  The natural history of the pathology was explained in full to the patient in layman's terms. All questions were answered.  The risks and benefits of surgical and non-surgical treatment alternatives were explained in full to the patient.\par

## 2022-10-18 NOTE — IMAGING
[de-identified] : Left knee:\par pain med and lateral joint line \par Crepitus\par NIVI\par Strenth 5/5\par Pulses +2 DP/PT\par Decreased ROM -\par

## 2022-10-18 NOTE — HISTORY OF PRESENT ILLNESS
[Work related] : work related [Sudden] : sudden [9] : 9 [5] : 5 [Sharp] : sharp [Constant] : constant [Household chores] : household chores [Leisure] : leisure [Rest] : rest [Meds] : meds [Walking] : walking [Stairs] : stairs [de-identified] : 10/18/22: Cortisone gave 1 week relief then pain returned.\par \par Previous doc:\par WC 2/27/13.\par Left knee pain for several years, fell at work sustaining meniscus tear. Prior to this had scope in 2007 with some relief.\par Over the years progressive worsening pain. had success in the past with HA and cortisone injections but this past year\par had less relief.. Cortisone inj 2 weeks ago only helped a few days. Lap band 2003 with 40 lb weight loss. Mult diets in\par the past always with  lb loss but then gains back.\par 4/30/19: Continued pain, started seeing bariatric surgeon and is on meal replacement plan. Down to 334 lbs.\par 5/28/19: Cont pain - lost another 10 lbs since last visit. Seeing bariatrics - may need 2 stage procedure to remove\par band and then get sleeve.\par 6/25/19: Cont pain - had to stop NSAIDs because of upcoming bariatric surgery. Taking Tylenol currently with very little\par relief. Surgery tentatively scheduled 7/24.\par 9/24/19: Cont pain. Had bariatic surgery in July.\par 12/9/19 Orthovisc #2 right knee.\par 12/17/19: Worsening pain - down to 287 lbs.\par 1/14/20: Cont pain, no change in weight.\par 2/11/20: TKA was denied, has hearing in 2 weeks. No change in weight recently.\par 5/13/20: Patient continues to having worsening pain. Has difficulty sleeping. His pain affects his daily life. Reports no\par change in weight since last visit. Would like a refill on his Duexis. Patient is not currently working due to COVID-19. \par 8/11/20: Cont pain, awaiting auth for TKA but deposition was rescheduled. now for 8/31?\par 9/22/20: No change - waiting for TKA auth.\par 11/3/20: Cont pain, TKA was authorized but struggling with weight loss.\par 12/15/20: Worsening left knee pain. Has been unsuccessful with weight loss. Started Ozympic this week.\par 3/16/21: Cont knee pain. Cortisone inj in Dec helped for 1-2 weeks.\par 4/16/21: Orthovisc #1 left knee.\par 4/27/21: Orthovisc #2 left knee.\par 5/4/21: Orthovisc #3 left knee.\par 5/11/21: Orthovisc #4 left knee.\par 6/8/21: Since last injection, he has had dull pain in left knee and does not have any clicking in left knee. Has been able\par to sleep at night and walk smoothly. knee still occ gives out on him \par 7/20 cont to have sig pain but inj helped - all medial pain -- \par 10/19/21: f/u LT knee, symptoms persist. No significant change. HA in the past with relief.\par 12/21/21: No change in symptoms from last visit.\par 1/18/22: Cont pain, no auth yet for HA injections.\par 3/1/22: Cont pain.\par 3/15/22: Orthovisc #1 left knee.\par 4/5/22: Orthovisc #4 left knee, some improvement overall.\par 5/17/22: HA injections helped but still has pain.\par 9/20/22: Had about 4 months relief from CSI   [] : no [FreeTextEntry1] : Left knee [FreeTextEntry5] : SANDRA JEWELL is a 62 year old M here for a WC follow up for left knee pain. Pt states that his pain has worsened since his last visit. [FreeTextEntry3] : 2/27[birth year]13 [FreeTextEntry6] : Lingering

## 2022-10-18 NOTE — ASSESSMENT
[FreeTextEntry1] : Previous doc:\par Adv OA left knee. Failed conservative treatments. BMI 53 today (340 lbs) - needs to be < 300 lbs (BMI < 40 would be 250). Had lap band in 2003 and is open to exploring gastric sleeve.\par 4/30/19: Continued pain - working on getting date for bariatrics. For now cont duexis prn and will try tramadol.\par Cortisone inj in the past have only helped for a few days at a time.\par 5/28/19: Doing well with weight loss and planning for removal of gastric band and may need 2 stage for sleeve.\par 6/25/19: Stable for now - planning for bariatrics end of next month - he will be unable to work for a while due to this.\par 9/24/19: Cont pain - working on weight loss (293 lbs today). Injections in the past only a few days relief so will hold on this.\par 12/17/19: Worsening pain. Weight down to 280s now and BMI is < 45 - will get auth for TKA. Has psoriasis but never affecting left knee.\par 1/14/20: Cont difficulty and waiting for left TKA auth.\par 2/11/20: No change - TKA was denied but has appeal hearing in 2 weeks. Cont weight loss.\par 5/13/20: continues to work on weight loss. His Pain and loss of of function continues to progress. He has to take pain medication on a daily basis due how severe his pain is. At this time i believe the benefits outweigh the risks. He will continue on weight loss and our goal of TKA. Refilled his duexis.\par 8/11/20: No change - has deposition scheduled in Aug.\par 9/22/20: No change - currently 330 lbs and discussed need to be < 300 lbs for TKA. Awaiting auth. At one point was 280 lbs but gained some back during pandemic.\par 11/3/20: TKA authorized but unable to proceed as he is struggling with weight loss. BMI too high to proceed with\par surgery and needs to be < 300, ideally 280s to have TKA.\par 12/15/20: Continued pain - No change in weight. Will continue to work on weight loss and continue with home\par exercises. Cortisone injection today.\par 3/16/21: Still with significant pain - weight loss has not progressed and he has been around 345 the past few months -\par no overall loss from bariatrics. Not a TKA candidate at this time due to weight. Will try orthovisc again.\par 4/16/21: Inj tolerated well - asp 5cc.\par 4/27/21: Inj tolerated well.\par 5/3/21: Inj tolerated well.\par 5/11/21: Inj tolerated well.\par 6/8/21 he has slow wt loss down to 337 - dealing with neuropathy and DM -some relief with Inj and he is concerned he may be disabled\par y \par 7/20/21 he is unable to retrun to work due to pain but did have some relief with Knee inj - cont o struggle with wt loss\par 10/19/21: Symptoms unchanged, he has responded to orthovisc and will make another request as he can restart series early-mid November 2021. Weight loss discussed. He will continue OOW.\par 12/21/21: No change in symptoms. Will discuss "granted w prejudice" with WC as clarification is needed of what this means. f/up in 4 weeks. He will continue OOW\par 1/18/22: No change - will start HA injections when aurthorized. Only has granted w prejudice at this time.\par 3/1/22: No auth required to start orthovisc - he is going away next week and would like to start the series when he returns.\par 3/15/22: Inj tolerated well.\par 4/5/22: Inj tolerated well, reeval in 6 weeks.\par 5/17/22: HA injections helped but still with significant pain levels - cortisone inj today tolerated well.  He is very frustrated with this situation and that he has had such difficulty with weight loss.\par 9/20/22: Repeat cortisone inj today tolreated well, will reeval in 1 month for possible HA injections again.\par \par 10/18/22: Cortisone only 1 week relief - request auth for orthovisc as this helped him earlier this year.  Not a surgical candidate and pain limits his ability to return to work.

## 2022-11-02 ENCOUNTER — APPOINTMENT (OUTPATIENT)
Dept: ORTHOPEDIC SURGERY | Facility: CLINIC | Age: 62
End: 2022-11-02

## 2022-11-02 ENCOUNTER — NON-APPOINTMENT (OUTPATIENT)
Age: 62
End: 2022-11-02

## 2022-11-02 VITALS — HEIGHT: 67 IN | WEIGHT: 315 LBS | BODY MASS INDEX: 49.44 KG/M2

## 2022-11-02 PROCEDURE — 99072 ADDL SUPL MATRL&STAF TM PHE: CPT

## 2022-11-02 PROCEDURE — 99214 OFFICE O/P EST MOD 30 MIN: CPT

## 2022-11-02 NOTE — HISTORY OF PRESENT ILLNESS
[] : yes [6] : 6 [2] : 2 [de-identified] : pt is here today for WC follow up for the right knee. pt states his pain is the same as last visit, gel injections helped some, completed 5/4/22. He recently had to stop NSAIDS secondary to diverticulosis, worse when cold [FreeTextEntry1] : right knee  [de-identified] : home exercises

## 2022-11-02 NOTE — DISCUSSION/SUMMARY
[de-identified] : Frequency of visco and CSI discussed.\par ice\par HEP\par request comp auth for orthovisc injections

## 2022-11-10 ENCOUNTER — APPOINTMENT (OUTPATIENT)
Dept: ORTHOPEDIC SURGERY | Facility: CLINIC | Age: 62
End: 2022-11-10

## 2022-11-10 VITALS — WEIGHT: 315 LBS | BODY MASS INDEX: 49.44 KG/M2 | HEIGHT: 67 IN

## 2022-11-10 PROCEDURE — 99072 ADDL SUPL MATRL&STAF TM PHE: CPT

## 2022-11-10 PROCEDURE — 99214 OFFICE O/P EST MOD 30 MIN: CPT

## 2022-11-10 RX ORDER — METHYLPREDNISOLONE 4 MG/1
4 TABLET ORAL
Qty: 1 | Refills: 0 | Status: ACTIVE | COMMUNITY
Start: 2022-11-10 | End: 1900-01-01

## 2022-11-10 NOTE — HISTORY OF PRESENT ILLNESS
[8] : 8 [5] : 5 [] : yes [de-identified] : Patient has persistent symptoms in the shoulder. PAin with reaching over head, behind back and sleeping. Recently symptoms are feeling a little worse.  [FreeTextEntry1] : right shoulder  [de-identified] : stretching

## 2022-11-10 NOTE — DISCUSSION/SUMMARY
[Medication Risks Reviewed] : Medication risks reviewed [de-identified] : \par Patient allowed to gently start resuming activities. \par Discussed change to medication prescription and usage. \par \par \par

## 2022-11-10 NOTE — PHYSICAL EXAM
[Right] : right shoulder [Sitting] : sitting [Mild] : mild [] : no ecchymosis [TWNoteComboBox7] : active forward flexion 140 degrees [TWNoteComboBox6] : internal rotation L5 [de-identified] : external rotation 50 degrees

## 2022-11-15 ENCOUNTER — APPOINTMENT (OUTPATIENT)
Dept: ORTHOPEDIC SURGERY | Facility: CLINIC | Age: 62
End: 2022-11-15

## 2022-11-15 VITALS — HEIGHT: 67 IN | WEIGHT: 315 LBS | BODY MASS INDEX: 49.44 KG/M2

## 2022-11-15 PROCEDURE — 99072 ADDL SUPL MATRL&STAF TM PHE: CPT

## 2022-11-15 PROCEDURE — 99212 OFFICE O/P EST SF 10 MIN: CPT | Mod: 25

## 2022-11-15 PROCEDURE — 20611 DRAIN/INJ JOINT/BURSA W/US: CPT

## 2022-11-15 NOTE — HISTORY OF PRESENT ILLNESS
[Work related] : work related [8] : 8 [5] : 5 [Not working due to injury] : Work status: not working due to injury [1] : 1 [Orthovisc] : Orthovisc [de-identified] : 11/15/22: Orthovisc L KNEE #1\par \par Previous doc:\par WC 2/27/13.\par Left knee pain for several years, fell at work sustaining meniscus tear. Prior to this had scope in 2007 with some relief.\par Over the years progressive worsening pain. had success in the past with HA and cortisone injections but this past year\par had less relief.. Cortisone inj 2 weeks ago only helped a few days. Lap band 2003 with 40 lb weight loss. Mult diets in\par the past always with  lb loss but then gains back.\par 4/30/19: Continued pain, started seeing bariatric surgeon and is on meal replacement plan. Down to 334 lbs.\par 5/28/19: Cont pain - lost another 10 lbs since last visit. Seeing bariatrics - may need 2 stage procedure to remove\par band and then get sleeve.\par 6/25/19: Cont pain - had to stop NSAIDs because of upcoming bariatric surgery. Taking Tylenol currently with very little\par relief. Surgery tentatively scheduled 7/24.\par 9/24/19: Cont pain. Had bariatic surgery in July.\par 12/9/19 Orthovisc #2 right knee.\par 12/17/19: Worsening pain - down to 287 lbs.\par 1/14/20: Cont pain, no change in weight.\par 2/11/20: TKA was denied, has hearing in 2 weeks. No change in weight recently.\par 5/13/20: Patient continues to having worsening pain. Has difficulty sleeping. His pain affects his daily life. Reports no\par change in weight since last visit. Would like a refill on his Duexis. Patient is not currently working due to COVID-19. \par 8/11/20: Cont pain, awaiting auth for TKA but deposition was rescheduled. now for 8/31?\par 9/22/20: No change - waiting for TKA auth.\par 11/3/20: Cont pain, TKA was authorized but struggling with weight loss.\par 12/15/20: Worsening left knee pain. Has been unsuccessful with weight loss. Started Ozympic this week.\par 3/16/21: Cont knee pain. Cortisone inj in Dec helped for 1-2 weeks.\par 4/16/21: Orthovisc #1 left knee.\par 4/27/21: Orthovisc #2 left knee.\par 5/4/21: Orthovisc #3 left knee.\par 5/11/21: Orthovisc #4 left knee.\par 6/8/21: Since last injection, he has had dull pain in left knee and does not have any clicking in left knee. Has been able\par to sleep at night and walk smoothly. knee still occ gives out on him \par 7/20 cont to have sig pain but inj helped - all medial pain -- \par 10/19/21: f/u LT knee, symptoms persist. No significant change. HA in the past with relief.\par 12/21/21: No change in symptoms from last visit.\par 1/18/22: Cont pain, no auth yet for HA injections.\par 3/1/22: Cont pain.\par 3/15/22: Orthovisc #1 left knee.\par 4/5/22: Orthovisc #4 left knee, some improvement overall.\par 5/17/22: HA injections helped but still has pain.\par 9/20/22: Had about 4 months relief from CSI  \par 10/18/22: Cortisone gave 1 week relief then pain returned. [] : no [FreeTextEntry1] : Left knee [FreeTextEntry3] : 2/27/13 [FreeTextEntry5] : SANDRA JEWELL is a 62 year old M here for INJ #1 of Orthovisc for left knee pain. Pt has had Orthovisc injections in the past.  [de-identified] : Left knee

## 2022-11-15 NOTE — PROCEDURE
[Large Joint Injection] : Large joint injection [Left] : of the left [Knee] : knee [Pain] : pain [Inflammation] : inflammation [X-ray evidence of Osteoarthritis on this or prior visit] : x-ray evidence of Osteoarthritis on this or prior visit [Repeat series performed] : repeat series performed [Alcohol] : alcohol [Betadine] : betadine [Ethyl Chloride sprayed topically] : ethyl chloride sprayed topically [Sterile technique used] : sterile technique used [___ cc    1%] : Lidocaine ~Vcc of 1%  [Orthovisc] : Orthovisc [#1] : series #1 [] : Patient tolerated procedure well [Call if redness, pain or fever occur] : call if redness, pain or fever occur [Apply ice for 15min out of every hour for the next 12-24 hours as tolerated] : apply ice for 15 minutes out of every hour for the next 12-24 hours as tolerated [Patient was advised to rest the joint(s) for ____ days] : patient was advised to rest the joint(s) for [unfilled] days [Previous OTC use and PT nontherapeutic] : patient has tried OTC's including aspirin, Ibuprofen, Aleve, etc or prescription NSAIDS, and/or exercises at home and/or physical therapy without satisfactory response [Patient had decreased mobility in the joint] : patient had decreased mobility in the joint [Risks, benefits, alternatives discussed / Verbal consent obtained] : the risks benefits, and alternatives have been discussed, and verbal consent was obtained [All ultrasound images have been permanently captured and stored accordingly in our picture archiving and communication system] : All ultrasound images have been permanently captured and stored accordingly in our picture archiving and communication system

## 2022-11-15 NOTE — ASSESSMENT
[FreeTextEntry1] : Previous doc:\par Adv OA left knee. Failed conservative treatments. BMI 53 today (340 lbs) - needs to be < 300 lbs (BMI < 40 would be 250). Had lap band in 2003 and is open to exploring gastric sleeve.\par 4/30/19: Continued pain - working on getting date for bariatrics. For now cont duexis prn and will try tramadol.\par Cortisone inj in the past have only helped for a few days at a time.\par 5/28/19: Doing well with weight loss and planning for removal of gastric band and may need 2 stage for sleeve.\par 6/25/19: Stable for now - planning for bariatrics end of next month - he will be unable to work for a while due to this.\par 9/24/19: Cont pain - working on weight loss (293 lbs today). Injections in the past only a few days relief so will hold on this.\par 12/17/19: Worsening pain. Weight down to 280s now and BMI is < 45 - will get auth for TKA. Has psoriasis but never affecting left knee.\par 1/14/20: Cont difficulty and waiting for left TKA auth.\par 2/11/20: No change - TKA was denied but has appeal hearing in 2 weeks. Cont weight loss.\par 5/13/20: continues to work on weight loss. His Pain and loss of of function continues to progress. He has to take pain medication on a daily basis due how severe his pain is. At this time i believe the benefits outweigh the risks. He will continue on weight loss and our goal of TKA. Refilled his duexis.\par 8/11/20: No change - has deposition scheduled in Aug.\par 9/22/20: No change - currently 330 lbs and discussed need to be < 300 lbs for TKA. Awaiting auth. At one point was 280 lbs but gained some back during pandemic.\par 11/3/20: TKA authorized but unable to proceed as he is struggling with weight loss. BMI too high to proceed with\par surgery and needs to be < 300, ideally 280s to have TKA.\par 12/15/20: Continued pain - No change in weight. Will continue to work on weight loss and continue with home\par exercises. Cortisone injection today.\par 3/16/21: Still with significant pain - weight loss has not progressed and he has been around 345 the past few months -\par no overall loss from bariatrics. Not a TKA candidate at this time due to weight. Will try orthovisc again.\par 4/16/21: Inj tolerated well - asp 5cc.\par 4/27/21: Inj tolerated well.\par 5/3/21: Inj tolerated well.\par 5/11/21: Inj tolerated well.\par 6/8/21 he has slow wt loss down to 337 - dealing with neuropathy and DM -some relief with Inj and he is concerned he may be disabled\par y \par 7/20/21 he is unable to retrun to work due to pain but did have some relief with Knee inj - cont o struggle with wt loss\par 10/19/21: Symptoms unchanged, he has responded to orthovisc and will make another request as he can restart series early-mid November 2021. Weight loss discussed. He will continue OOW.\par 12/21/21: No change in symptoms. Will discuss "granted w prejudice" with WC as clarification is needed of what this means. f/up in 4 weeks. He will continue OOW\par 1/18/22: No change - will start HA injections when aurthorized. Only has granted w prejudice at this time.\par 3/1/22: No auth required to start orthovisc - he is going away next week and would like to start the series when he returns.\par 3/15/22: Inj tolerated well.\par 4/5/22: Inj tolerated well, reeval in 6 weeks.\par 5/17/22: HA injections helped but still with significant pain levels - cortisone inj today tolerated well.  He is very frustrated with this situation and that he has had such difficulty with weight loss.\par 9/20/22: Repeat cortisone inj today tolreated well, will reeval in 1 month for possible HA injections again.\par 10/18/22: Cortisone only 1 week relief - request auth for orthovisc as this helped him earlier this year.  Not a surgical candidate and pain limits his ability to return to work.\par \par 11/15/22: Inj tolerated well.

## 2022-11-15 NOTE — DISCUSSION/SUMMARY
[de-identified] : The patient was advised of the diagnosis.  The natural history of the pathology was explained in full to the patient in layman's terms. All questions were answered.  The risks and benefits of surgical and non-surgical treatment alternatives were explained in full to the patient.\par

## 2022-11-15 NOTE — IMAGING
[de-identified] : Left knee:\par pain med and lateral joint line \par Crepitus\par NIVI\par Strenth 5/5\par Pulses +2 DP/PT\par Decreased ROM -\par

## 2022-11-17 ENCOUNTER — FORM ENCOUNTER (OUTPATIENT)
Age: 62
End: 2022-11-17

## 2022-11-22 ENCOUNTER — APPOINTMENT (OUTPATIENT)
Dept: ORTHOPEDIC SURGERY | Facility: CLINIC | Age: 62
End: 2022-11-22

## 2022-11-22 VITALS — WEIGHT: 315 LBS | BODY MASS INDEX: 49.44 KG/M2 | HEIGHT: 67 IN

## 2022-11-22 PROCEDURE — 99072 ADDL SUPL MATRL&STAF TM PHE: CPT

## 2022-11-22 PROCEDURE — 20611 DRAIN/INJ JOINT/BURSA W/US: CPT | Mod: LT

## 2022-11-22 PROCEDURE — 99212 OFFICE O/P EST SF 10 MIN: CPT | Mod: 25

## 2022-11-22 NOTE — IMAGING
[de-identified] : Left knee:\par pain med and lateral joint line \par Crepitus\par NIVI\par Strenth 5/5\par Pulses +2 DP/PT\par Decreased ROM -\par

## 2022-11-22 NOTE — ASSESSMENT
[FreeTextEntry1] : Previous doc:\par Adv OA left knee. Failed conservative treatments. BMI 53 today (340 lbs) - needs to be < 300 lbs (BMI < 40 would be 250). Had lap band in 2003 and is open to exploring gastric sleeve.\par 4/30/19: Continued pain - working on getting date for bariatrics. For now cont duexis prn and will try tramadol.\par Cortisone inj in the past have only helped for a few days at a time.\par 5/28/19: Doing well with weight loss and planning for removal of gastric band and may need 2 stage for sleeve.\par 6/25/19: Stable for now - planning for bariatrics end of next month - he will be unable to work for a while due to this.\par 9/24/19: Cont pain - working on weight loss (293 lbs today). Injections in the past only a few days relief so will hold on this.\par 12/17/19: Worsening pain. Weight down to 280s now and BMI is < 45 - will get auth for TKA. Has psoriasis but never affecting left knee.\par 1/14/20: Cont difficulty and waiting for left TKA auth.\par 2/11/20: No change - TKA was denied but has appeal hearing in 2 weeks. Cont weight loss.\par 5/13/20: continues to work on weight loss. His Pain and loss of of function continues to progress. He has to take pain medication on a daily basis due how severe his pain is. At this time i believe the benefits outweigh the risks. He will continue on weight loss and our goal of TKA. Refilled his duexis.\par 8/11/20: No change - has deposition scheduled in Aug.\par 9/22/20: No change - currently 330 lbs and discussed need to be < 300 lbs for TKA. Awaiting auth. At one point was 280 lbs but gained some back during pandemic.\par 11/3/20: TKA authorized but unable to proceed as he is struggling with weight loss. BMI too high to proceed with\par surgery and needs to be < 300, ideally 280s to have TKA.\par 12/15/20: Continued pain - No change in weight. Will continue to work on weight loss and continue with home\par exercises. Cortisone injection today.\par 3/16/21: Still with significant pain - weight loss has not progressed and he has been around 345 the past few months -\par no overall loss from bariatrics. Not a TKA candidate at this time due to weight. Will try orthovisc again.\par 4/16/21: Inj tolerated well - asp 5cc.\par 4/27/21: Inj tolerated well.\par 5/3/21: Inj tolerated well.\par 5/11/21: Inj tolerated well.\par 6/8/21 he has slow wt loss down to 337 - dealing with neuropathy and DM -some relief with Inj and he is concerned he may be disabled\par y \par 7/20/21 he is unable to retrun to work due to pain but did have some relief with Knee inj - cont o struggle with wt loss\par 10/19/21: Symptoms unchanged, he has responded to orthovisc and will make another request as he can restart series early-mid November 2021. Weight loss discussed. He will continue OOW.\par 12/21/21: No change in symptoms. Will discuss "granted w prejudice" with WC as clarification is needed of what this means. f/up in 4 weeks. He will continue OOW\par 1/18/22: No change - will start HA injections when aurthorized. Only has granted w prejudice at this time.\par 3/1/22: No auth required to start orthovisc - he is going away next week and would like to start the series when he returns.\par 3/15/22: Inj tolerated well.\par 4/5/22: Inj tolerated well, reeval in 6 weeks.\par 5/17/22: HA injections helped but still with significant pain levels - cortisone inj today tolerated well.  He is very frustrated with this situation and that he has had such difficulty with weight loss.\par 9/20/22: Repeat cortisone inj today tolreated well, will reeval in 1 month for possible HA injections again.\par 10/18/22: Cortisone only 1 week relief - request auth for orthovisc as this helped him earlier this year.  Not a surgical candidate and pain limits his ability to return to work.\par 11/15/22: Inj tolerated well.\par \par 11/22/22: Inj tolerated well.

## 2022-11-22 NOTE — HISTORY OF PRESENT ILLNESS
[2] : 2 [Orthovisc] : Orthovisc [de-identified] : 11/22/22: Orthovisc #2 left knee.\par \par Previous doc:\par WC 2/27/13.\par Left knee pain for several years, fell at work sustaining meniscus tear. Prior to this had scope in 2007 with some relief.\par Over the years progressive worsening pain. had success in the past with HA and cortisone injections but this past year\par had less relief.. Cortisone inj 2 weeks ago only helped a few days. Lap band 2003 with 40 lb weight loss. Mult diets in\par the past always with  lb loss but then gains back.\par 4/30/19: Continued pain, started seeing bariatric surgeon and is on meal replacement plan. Down to 334 lbs.\par 5/28/19: Cont pain - lost another 10 lbs since last visit. Seeing bariatrics - may need 2 stage procedure to remove\par band and then get sleeve.\par 6/25/19: Cont pain - had to stop NSAIDs because of upcoming bariatric surgery. Taking Tylenol currently with very little\par relief. Surgery tentatively scheduled 7/24.\par 9/24/19: Cont pain. Had bariatic surgery in July.\par 12/9/19 Orthovisc #2 right knee.\par 12/17/19: Worsening pain - down to 287 lbs.\par 1/14/20: Cont pain, no change in weight.\par 2/11/20: TKA was denied, has hearing in 2 weeks. No change in weight recently.\par 5/13/20: Patient continues to having worsening pain. Has difficulty sleeping. His pain affects his daily life. Reports no\par change in weight since last visit. Would like a refill on his Duexis. Patient is not currently working due to COVID-19. \par 8/11/20: Cont pain, awaiting auth for TKA but deposition was rescheduled. now for 8/31?\par 9/22/20: No change - waiting for TKA auth.\par 11/3/20: Cont pain, TKA was authorized but struggling with weight loss.\par 12/15/20: Worsening left knee pain. Has been unsuccessful with weight loss. Started Ozympic this week.\par 3/16/21: Cont knee pain. Cortisone inj in Dec helped for 1-2 weeks.\par 4/16/21: Orthovisc #1 left knee.\par 4/27/21: Orthovisc #2 left knee.\par 5/4/21: Orthovisc #3 left knee.\par 5/11/21: Orthovisc #4 left knee.\par 6/8/21: Since last injection, he has had dull pain in left knee and does not have any clicking in left knee. Has been able\par to sleep at night and walk smoothly. knee still occ gives out on him \par 7/20 cont to have sig pain but inj helped - all medial pain -- \par 10/19/21: f/u LT knee, symptoms persist. No significant change. HA in the past with relief.\par 12/21/21: No change in symptoms from last visit.\par 1/18/22: Cont pain, no auth yet for HA injections.\par 3/1/22: Cont pain.\par 3/15/22: Orthovisc #1 left knee.\par 4/5/22: Orthovisc #4 left knee, some improvement overall.\par 5/17/22: HA injections helped but still has pain.\par 9/20/22: Had about 4 months relief from CSI  \par 10/18/22: Cortisone gave 1 week relief then pain returned.\par 11/15/22: Orthovisc L KNEE #1 [de-identified] : 11/15/22 [de-identified] : left knee [de-identified] : orthovisc

## 2022-11-22 NOTE — PROCEDURE
[Large Joint Injection] : Large joint injection [Left] : of the left [Knee] : knee [Pain] : pain [Inflammation] : inflammation [X-ray evidence of Osteoarthritis on this or prior visit] : x-ray evidence of Osteoarthritis on this or prior visit [Repeat series performed] : repeat series performed [Alcohol] : alcohol [Betadine] : betadine [Ethyl Chloride sprayed topically] : ethyl chloride sprayed topically [Sterile technique used] : sterile technique used [___ cc    1%] : Lidocaine ~Vcc of 1%  [#2] : series #2 [] : Patient tolerated procedure well [Call if redness, pain or fever occur] : call if redness, pain or fever occur [Apply ice for 15min out of every hour for the next 12-24 hours as tolerated] : apply ice for 15 minutes out of every hour for the next 12-24 hours as tolerated [Patient was advised to rest the joint(s) for ____ days] : patient was advised to rest the joint(s) for [unfilled] days [Previous OTC use and PT nontherapeutic] : patient has tried OTC's including aspirin, Ibuprofen, Aleve, etc or prescription NSAIDS, and/or exercises at home and/or physical therapy without satisfactory response [Patient had decreased mobility in the joint] : patient had decreased mobility in the joint [Risks, benefits, alternatives discussed / Verbal consent obtained] : the risks benefits, and alternatives have been discussed, and verbal consent was obtained [All ultrasound images have been permanently captured and stored accordingly in our picture archiving and communication system] : All ultrasound images have been permanently captured and stored accordingly in our picture archiving and communication system

## 2022-11-29 ENCOUNTER — APPOINTMENT (OUTPATIENT)
Dept: ORTHOPEDIC SURGERY | Facility: CLINIC | Age: 62
End: 2022-11-29

## 2022-11-29 PROCEDURE — 20611 DRAIN/INJ JOINT/BURSA W/US: CPT

## 2022-11-29 PROCEDURE — 99072 ADDL SUPL MATRL&STAF TM PHE: CPT

## 2022-11-29 PROCEDURE — 99212 OFFICE O/P EST SF 10 MIN: CPT | Mod: ACP,25

## 2022-11-29 NOTE — HISTORY OF PRESENT ILLNESS
[Dull/Aching] : dull/aching [3] : 3 [Orthovisc] : Orthovisc [de-identified] : 11/29/22: Orthovisc #3 left knee.\par \par Previous doc:\par WC 2/27/13.\par Left knee pain for several years, fell at work sustaining meniscus tear. Prior to this had scope in 2007 with some relief.\par Over the years progressive worsening pain. had success in the past with HA and cortisone injections but this past year\par had less relief.. Cortisone inj 2 weeks ago only helped a few days. Lap band 2003 with 40 lb weight loss. Mult diets in\par the past always with  lb loss but then gains back.\par 4/30/19: Continued pain, started seeing bariatric surgeon and is on meal replacement plan. Down to 334 lbs.\par 5/28/19: Cont pain - lost another 10 lbs since last visit. Seeing bariatrics - may need 2 stage procedure to remove\par band and then get sleeve.\par 6/25/19: Cont pain - had to stop NSAIDs because of upcoming bariatric surgery. Taking Tylenol currently with very little\par relief. Surgery tentatively scheduled 7/24.\par 9/24/19: Cont pain. Had bariatic surgery in July.\par 12/9/19 Orthovisc #2 right knee.\par 12/17/19: Worsening pain - down to 287 lbs.\par 1/14/20: Cont pain, no change in weight.\par 2/11/20: TKA was denied, has hearing in 2 weeks. No change in weight recently.\par 5/13/20: Patient continues to having worsening pain. Has difficulty sleeping. His pain affects his daily life. Reports no\par change in weight since last visit. Would like a refill on his Duexis. Patient is not currently working due to COVID-19. \par 8/11/20: Cont pain, awaiting auth for TKA but deposition was rescheduled. now for 8/31?\par 9/22/20: No change - waiting for TKA auth.\par 11/3/20: Cont pain, TKA was authorized but struggling with weight loss.\par 12/15/20: Worsening left knee pain. Has been unsuccessful with weight loss. Started Ozympic this week.\par 3/16/21: Cont knee pain. Cortisone inj in Dec helped for 1-2 weeks.\par 4/16/21: Orthovisc #1 left knee.\par 4/27/21: Orthovisc #2 left knee.\par 5/4/21: Orthovisc #3 left knee.\par 5/11/21: Orthovisc #4 left knee.\par 6/8/21: Since last injection, he has had dull pain in left knee and does not have any clicking in left knee. Has been able\par to sleep at night and walk smoothly. knee still occ gives out on him \par 7/20 cont to have sig pain but inj helped - all medial pain -- \par 10/19/21: f/u LT knee, symptoms persist. No significant change. HA in the past with relief.\par 12/21/21: No change in symptoms from last visit.\par 1/18/22: Cont pain, no auth yet for HA injections.\par 3/1/22: Cont pain.\par 3/15/22: Orthovisc #1 left knee.\par 4/5/22: Orthovisc #4 left knee, some improvement overall.\par 5/17/22: HA injections helped but still has pain.\par 9/20/22: Had about 4 months relief from CSI  \par 10/18/22: Cortisone gave 1 week relief then pain returned.\par 11/15/22: Orthovisc L KNEE #1\par 11/22/22: Orthovisc #2 left knee. [] : no [FreeTextEntry1] : left knee  [FreeTextEntry5] : SANDRA is here today for injection #3 of orthovisc into left knee.  [de-identified] : 11/22/22 [de-identified] : left knee

## 2022-11-29 NOTE — IMAGING
[de-identified] : Left knee:\par pain med and lateral joint line \par Crepitus\par NIVI\par Strenth 5/5\par Pulses +2 DP/PT\par Decreased ROM -\par

## 2022-11-29 NOTE — ASSESSMENT
[FreeTextEntry1] : Previous doc:\par Adv OA left knee. Failed conservative treatments. BMI 53 today (340 lbs) - needs to be < 300 lbs (BMI < 40 would be 250). Had lap band in 2003 and is open to exploring gastric sleeve.\par 4/30/19: Continued pain - working on getting date for bariatrics. For now cont duexis prn and will try tramadol.\par Cortisone inj in the past have only helped for a few days at a time.\par 5/28/19: Doing well with weight loss and planning for removal of gastric band and may need 2 stage for sleeve.\par 6/25/19: Stable for now - planning for bariatrics end of next month - he will be unable to work for a while due to this.\par 9/24/19: Cont pain - working on weight loss (293 lbs today). Injections in the past only a few days relief so will hold on this.\par 12/17/19: Worsening pain. Weight down to 280s now and BMI is < 45 - will get auth for TKA. Has psoriasis but never affecting left knee.\par 1/14/20: Cont difficulty and waiting for left TKA auth.\par 2/11/20: No change - TKA was denied but has appeal hearing in 2 weeks. Cont weight loss.\par 5/13/20: continues to work on weight loss. His Pain and loss of of function continues to progress. He has to take pain medication on a daily basis due how severe his pain is. At this time i believe the benefits outweigh the risks. He will continue on weight loss and our goal of TKA. Refilled his duexis.\par 8/11/20: No change - has deposition scheduled in Aug.\par 9/22/20: No change - currently 330 lbs and discussed need to be < 300 lbs for TKA. Awaiting auth. At one point was 280 lbs but gained some back during pandemic.\par 11/3/20: TKA authorized but unable to proceed as he is struggling with weight loss. BMI too high to proceed with\par surgery and needs to be < 300, ideally 280s to have TKA.\par 12/15/20: Continued pain - No change in weight. Will continue to work on weight loss and continue with home\par exercises. Cortisone injection today.\par 3/16/21: Still with significant pain - weight loss has not progressed and he has been around 345 the past few months -\par no overall loss from bariatrics. Not a TKA candidate at this time due to weight. Will try orthovisc again.\par 4/16/21: Inj tolerated well - asp 5cc.\par 4/27/21: Inj tolerated well.\par 5/3/21: Inj tolerated well.\par 5/11/21: Inj tolerated well.\par 6/8/21 he has slow wt loss down to 337 - dealing with neuropathy and DM -some relief with Inj and he is concerned he may be disabled\par y \par 7/20/21 he is unable to retrun to work due to pain but did have some relief with Knee inj - cont o struggle with wt loss\par 10/19/21: Symptoms unchanged, he has responded to orthovisc and will make another request as he can restart series early-mid November 2021. Weight loss discussed. He will continue OOW.\par 12/21/21: No change in symptoms. Will discuss "granted w prejudice" with WC as clarification is needed of what this means. f/up in 4 weeks. He will continue OOW\par 1/18/22: No change - will start HA injections when aurthorized. Only has granted w prejudice at this time.\par 3/1/22: No auth required to start orthovisc - he is going away next week and would like to start the series when he returns.\par 3/15/22: Inj tolerated well.\par 4/5/22: Inj tolerated well, reeval in 6 weeks.\par 5/17/22: HA injections helped but still with significant pain levels - cortisone inj today tolerated well.  He is very frustrated with this situation and that he has had such difficulty with weight loss.\par 9/20/22: Repeat cortisone inj today tolreated well, will reeval in 1 month for possible HA injections again.\par 10/18/22: Cortisone only 1 week relief - request auth for orthovisc as this helped him earlier this year.  Not a surgical candidate and pain limits his ability to return to work.\par 11/15/22: Inj tolerated well.\par 11/22/22: Inj tolerated well.\par \par 11/29/22: Inj tolerated well.

## 2022-11-29 NOTE — PROCEDURE
[Large Joint Injection] : Large joint injection [Left] : of the left [Knee] : knee [Pain] : pain [Inflammation] : inflammation [X-ray evidence of Osteoarthritis on this or prior visit] : x-ray evidence of Osteoarthritis on this or prior visit [Repeat series performed] : repeat series performed [Alcohol] : alcohol [Betadine] : betadine [Ethyl Chloride sprayed topically] : ethyl chloride sprayed topically [Sterile technique used] : sterile technique used [___ cc    1%] : Lidocaine ~Vcc of 1%  [#3] : series #3 [] : Patient tolerated procedure well [Call if redness, pain or fever occur] : call if redness, pain or fever occur [Apply ice for 15min out of every hour for the next 12-24 hours as tolerated] : apply ice for 15 minutes out of every hour for the next 12-24 hours as tolerated [Patient was advised to rest the joint(s) for ____ days] : patient was advised to rest the joint(s) for [unfilled] days [Previous OTC use and PT nontherapeutic] : patient has tried OTC's including aspirin, Ibuprofen, Aleve, etc or prescription NSAIDS, and/or exercises at home and/or physical therapy without satisfactory response [Patient had decreased mobility in the joint] : patient had decreased mobility in the joint [Risks, benefits, alternatives discussed / Verbal consent obtained] : the risks benefits, and alternatives have been discussed, and verbal consent was obtained [All ultrasound images have been permanently captured and stored accordingly in our picture archiving and communication system] : All ultrasound images have been permanently captured and stored accordingly in our picture archiving and communication system

## 2022-12-06 ENCOUNTER — APPOINTMENT (OUTPATIENT)
Dept: ORTHOPEDIC SURGERY | Facility: CLINIC | Age: 62
End: 2022-12-06

## 2022-12-06 PROCEDURE — 99212 OFFICE O/P EST SF 10 MIN: CPT | Mod: ACP,25

## 2022-12-06 PROCEDURE — 99072 ADDL SUPL MATRL&STAF TM PHE: CPT

## 2022-12-06 PROCEDURE — 20611 DRAIN/INJ JOINT/BURSA W/US: CPT | Mod: LT

## 2022-12-06 NOTE — IMAGING
[de-identified] : Left knee:\par pain med and lateral joint line \par Crepitus\par NIVI\par Strenth 5/5\par Pulses +2 DP/PT\par Decreased ROM -\par

## 2022-12-06 NOTE — HISTORY OF PRESENT ILLNESS
[4] : 4 [Orthovisc] : Orthovisc [de-identified] : 12/6/22: Orthovisc #4 left knee.\par \par Previous doc:\par WC 2/27/13.\par Left knee pain for several years, fell at work sustaining meniscus tear. Prior to this had scope in 2007 with some relief.\par Over the years progressive worsening pain. had success in the past with HA and cortisone injections but this past year\par had less relief.. Cortisone inj 2 weeks ago only helped a few days. Lap band 2003 with 40 lb weight loss. Mult diets in\par the past always with  lb loss but then gains back.\par 4/30/19: Continued pain, started seeing bariatric surgeon and is on meal replacement plan. Down to 334 lbs.\par 5/28/19: Cont pain - lost another 10 lbs since last visit. Seeing bariatrics - may need 2 stage procedure to remove\par band and then get sleeve.\par 6/25/19: Cont pain - had to stop NSAIDs because of upcoming bariatric surgery. Taking Tylenol currently with very little\par relief. Surgery tentatively scheduled 7/24.\par 9/24/19: Cont pain. Had bariatic surgery in July.\par 12/9/19 Orthovisc #2 right knee.\par 12/17/19: Worsening pain - down to 287 lbs.\par 1/14/20: Cont pain, no change in weight.\par 2/11/20: TKA was denied, has hearing in 2 weeks. No change in weight recently.\par 5/13/20: Patient continues to having worsening pain. Has difficulty sleeping. His pain affects his daily life. Reports no\par change in weight since last visit. Would like a refill on his Duexis. Patient is not currently working due to COVID-19. \par 8/11/20: Cont pain, awaiting auth for TKA but deposition was rescheduled. now for 8/31?\par 9/22/20: No change - waiting for TKA auth.\par 11/3/20: Cont pain, TKA was authorized but struggling with weight loss.\par 12/15/20: Worsening left knee pain. Has been unsuccessful with weight loss. Started Ozympic this week.\par 3/16/21: Cont knee pain. Cortisone inj in Dec helped for 1-2 weeks.\par 4/16/21: Orthovisc #1 left knee.\par 4/27/21: Orthovisc #2 left knee.\par 5/4/21: Orthovisc #3 left knee.\par 5/11/21: Orthovisc #4 left knee.\par 6/8/21: Since last injection, he has had dull pain in left knee and does not have any clicking in left knee. Has been able\par to sleep at night and walk smoothly. knee still occ gives out on him \par 7/20 cont to have sig pain but inj helped - all medial pain -- \par 10/19/21: f/u LT knee, symptoms persist. No significant change. HA in the past with relief.\par 12/21/21: No change in symptoms from last visit.\par 1/18/22: Cont pain, no auth yet for HA injections.\par 3/1/22: Cont pain.\par 3/15/22: Orthovisc #1 left knee.\par 4/5/22: Orthovisc #4 left knee, some improvement overall.\par 5/17/22: HA injections helped but still has pain.\par 9/20/22: Had about 4 months relief from CSI  \par 10/18/22: Cortisone gave 1 week relief then pain returned.\par 11/15/22: Orthovisc L KNEE #1\par 11/22/22: Orthovisc #2 left knee.\par 11/29/22: Orthovisc #3 left knee. [] : no [FreeTextEntry1] : left knee  [FreeTextEntry5] : pt is here for injection #4 of orthovisc into left knee. pain is about the same since last visit. [de-identified] : 11/29/22 [de-identified] : left knee

## 2022-12-06 NOTE — ASSESSMENT
[FreeTextEntry1] : Previous doc:\par Adv OA left knee. Failed conservative treatments. BMI 53 today (340 lbs) - needs to be < 300 lbs (BMI < 40 would be 250). Had lap band in 2003 and is open to exploring gastric sleeve.\par 4/30/19: Continued pain - working on getting date for bariatrics. For now cont duexis prn and will try tramadol.\par Cortisone inj in the past have only helped for a few days at a time.\par 5/28/19: Doing well with weight loss and planning for removal of gastric band and may need 2 stage for sleeve.\par 6/25/19: Stable for now - planning for bariatrics end of next month - he will be unable to work for a while due to this.\par 9/24/19: Cont pain - working on weight loss (293 lbs today). Injections in the past only a few days relief so will hold on this.\par 12/17/19: Worsening pain. Weight down to 280s now and BMI is < 45 - will get auth for TKA. Has psoriasis but never affecting left knee.\par 1/14/20: Cont difficulty and waiting for left TKA auth.\par 2/11/20: No change - TKA was denied but has appeal hearing in 2 weeks. Cont weight loss.\par 5/13/20: continues to work on weight loss. His Pain and loss of of function continues to progress. He has to take pain medication on a daily basis due how severe his pain is. At this time i believe the benefits outweigh the risks. He will continue on weight loss and our goal of TKA. Refilled his duexis.\par 8/11/20: No change - has deposition scheduled in Aug.\par 9/22/20: No change - currently 330 lbs and discussed need to be < 300 lbs for TKA. Awaiting auth. At one point was 280 lbs but gained some back during pandemic.\par 11/3/20: TKA authorized but unable to proceed as he is struggling with weight loss. BMI too high to proceed with\par surgery and needs to be < 300, ideally 280s to have TKA.\par 12/15/20: Continued pain - No change in weight. Will continue to work on weight loss and continue with home\par exercises. Cortisone injection today.\par 3/16/21: Still with significant pain - weight loss has not progressed and he has been around 345 the past few months -\par no overall loss from bariatrics. Not a TKA candidate at this time due to weight. Will try orthovisc again.\par 4/16/21: Inj tolerated well - asp 5cc.\par 4/27/21: Inj tolerated well.\par 5/3/21: Inj tolerated well.\par 5/11/21: Inj tolerated well.\par 6/8/21 he has slow wt loss down to 337 - dealing with neuropathy and DM -some relief with Inj and he is concerned he may be disabled\par y \par 7/20/21 he is unable to retrun to work due to pain but did have some relief with Knee inj - cont o struggle with wt loss\par 10/19/21: Symptoms unchanged, he has responded to orthovisc and will make another request as he can restart series early-mid November 2021. Weight loss discussed. He will continue OOW.\par 12/21/21: No change in symptoms. Will discuss "granted w prejudice" with WC as clarification is needed of what this means. f/up in 4 weeks. He will continue OOW\par 1/18/22: No change - will start HA injections when aurthorized. Only has granted w prejudice at this time.\par 3/1/22: No auth required to start orthovisc - he is going away next week and would like to start the series when he returns.\par 3/15/22: Inj tolerated well.\par 4/5/22: Inj tolerated well, reeval in 6 weeks.\par 5/17/22: HA injections helped but still with significant pain levels - cortisone inj today tolerated well.  He is very frustrated with this situation and that he has had such difficulty with weight loss.\par 9/20/22: Repeat cortisone inj today tolreated well, will reeval in 1 month for possible HA injections again.\par 10/18/22: Cortisone only 1 week relief - request auth for orthovisc as this helped him earlier this year.  Not a surgical candidate and pain limits his ability to return to work.\par 11/15/22: Inj tolerated well.\par 11/22/22: Inj tolerated well.\par 11/29/22: Inj tolerated well.\par \par 12/6/22: Inj tolerated well.

## 2022-12-06 NOTE — PROCEDURE
[Large Joint Injection] : Large joint injection [Left] : of the left [Knee] : knee [Pain] : pain [Inflammation] : inflammation [X-ray evidence of Osteoarthritis on this or prior visit] : x-ray evidence of Osteoarthritis on this or prior visit [Repeat series performed] : repeat series performed [Alcohol] : alcohol [Betadine] : betadine [Ethyl Chloride sprayed topically] : ethyl chloride sprayed topically [Sterile technique used] : sterile technique used [___ cc    1%] : Lidocaine ~Vcc of 1%  [#4] : series #4 [] : Patient tolerated procedure well [Call if redness, pain or fever occur] : call if redness, pain or fever occur [Apply ice for 15min out of every hour for the next 12-24 hours as tolerated] : apply ice for 15 minutes out of every hour for the next 12-24 hours as tolerated [Patient was advised to rest the joint(s) for ____ days] : patient was advised to rest the joint(s) for [unfilled] days [Previous OTC use and PT nontherapeutic] : patient has tried OTC's including aspirin, Ibuprofen, Aleve, etc or prescription NSAIDS, and/or exercises at home and/or physical therapy without satisfactory response [Patient had decreased mobility in the joint] : patient had decreased mobility in the joint [Risks, benefits, alternatives discussed / Verbal consent obtained] : the risks benefits, and alternatives have been discussed, and verbal consent was obtained [All ultrasound images have been permanently captured and stored accordingly in our picture archiving and communication system] : All ultrasound images have been permanently captured and stored accordingly in our picture archiving and communication system

## 2022-12-14 ENCOUNTER — APPOINTMENT (OUTPATIENT)
Dept: ORTHOPEDIC SURGERY | Facility: CLINIC | Age: 62
End: 2022-12-14

## 2022-12-14 VITALS — BODY MASS INDEX: 49.44 KG/M2 | HEIGHT: 67 IN | WEIGHT: 315 LBS

## 2022-12-14 PROCEDURE — 99072 ADDL SUPL MATRL&STAF TM PHE: CPT

## 2022-12-14 PROCEDURE — 99214 OFFICE O/P EST MOD 30 MIN: CPT

## 2022-12-14 NOTE — DISCUSSION/SUMMARY
[de-identified] : Frequency of visco and CSI discussed.\par ice\par HEP\par request comp auth for orthovisc injections as it has helped the patient in the past.\par \par 12/14/2022 \par RE:  SANDRA JEWELL \par \par Acct #- [id number] \par Attention:  Nurse Reviewer /Medical Director\par \par I am writing this letter as a medical necessity for HA orthovisc\par Patient has tried analgesics, non-steroid anti-inflammatory agents, \par physical therapy, hot or cold compresses,injections of corticosteroids, etc)  which in combination or by themselves has not worked.\par Based on my patient's condition, I strongly believe that the Hyaluronic aid injections is medically needed.\par  \par Thank you for your time and consideration.   \par

## 2022-12-14 NOTE — HISTORY OF PRESENT ILLNESS
[6] : 6 [3] : 3 [Constant] : constant [de-identified] : pt is here today for WC follow up for the right knee. pt states his pain is the same as last visit, gel injections helped some, completed 5/4/22. He has persistent symptoms, wants to start ORthovisc series again, Orthovisc provided the patient relief for several months, please authorize.  [FreeTextEntry1] : right knee

## 2022-12-19 ENCOUNTER — RX RENEWAL (OUTPATIENT)
Age: 62
End: 2022-12-19

## 2022-12-19 RX ORDER — FAMOTIDINE 40 MG/1
40 TABLET, FILM COATED ORAL DAILY
Qty: 30 | Refills: 0 | Status: ACTIVE | COMMUNITY
Start: 2022-09-08 | End: 1900-01-01

## 2023-01-03 ENCOUNTER — APPOINTMENT (OUTPATIENT)
Dept: ORTHOPEDIC SURGERY | Facility: CLINIC | Age: 63
End: 2023-01-03
Payer: OTHER MISCELLANEOUS

## 2023-01-03 PROCEDURE — 99213 OFFICE O/P EST LOW 20 MIN: CPT | Mod: ACP

## 2023-01-03 PROCEDURE — 99072 ADDL SUPL MATRL&STAF TM PHE: CPT

## 2023-01-03 NOTE — IMAGING
[de-identified] : Left knee:\par pain med and lateral joint line \par Crepitus\par NIVI\par Strenth 5/5\par Pulses +2 DP/PT\par Decreased ROM -\par

## 2023-01-03 NOTE — HISTORY OF PRESENT ILLNESS
[Work related] : work related [Intermittent] : intermittent [Not working due to injury] : Work status: not working due to injury [de-identified] : 01/03/23: HA injections helped but still has pain.\par \par Previous doc:\par WC 2/27/13.\par Left knee pain for several years, fell at work sustaining meniscus tear. Prior to this had scope in 2007 with some relief.\par Over the years progressive worsening pain. had success in the past with HA and cortisone injections but this past year\par had less relief.. Cortisone inj 2 weeks ago only helped a few days. Lap band 2003 with 40 lb weight loss. Mult diets in\par the past always with  lb loss but then gains back.\par 4/30/19: Continued pain, started seeing bariatric surgeon and is on meal replacement plan. Down to 334 lbs.\par 5/28/19: Cont pain - lost another 10 lbs since last visit. Seeing bariatrics - may need 2 stage procedure to remove\par band and then get sleeve.\par 6/25/19: Cont pain - had to stop NSAIDs because of upcoming bariatric surgery. Taking Tylenol currently with very little\par relief. Surgery tentatively scheduled 7/24.\par 9/24/19: Cont pain. Had bariatic surgery in July.\par 12/9/19 Orthovisc #2 right knee.\par 12/17/19: Worsening pain - down to 287 lbs.\par 1/14/20: Cont pain, no change in weight.\par 2/11/20: TKA was denied, has hearing in 2 weeks. No change in weight recently.\par 5/13/20: Patient continues to having worsening pain. Has difficulty sleeping. His pain affects his daily life. Reports no\par change in weight since last visit. Would like a refill on his Duexis. Patient is not currently working due to COVID-19. \par 8/11/20: Cont pain, awaiting auth for TKA but deposition was rescheduled. now for 8/31?\par 9/22/20: No change - waiting for TKA auth.\par 11/3/20: Cont pain, TKA was authorized but struggling with weight loss.\par 12/15/20: Worsening left knee pain. Has been unsuccessful with weight loss. Started Ozympic this week.\par 3/16/21: Cont knee pain. Cortisone inj in Dec helped for 1-2 weeks.\par 4/16/21: Orthovisc #1 left knee.\par 4/27/21: Orthovisc #2 left knee.\par 5/4/21: Orthovisc #3 left knee.\par 5/11/21: Orthovisc #4 left knee.\par 6/8/21: Since last injection, he has had dull pain in left knee and does not have any clicking in left knee. Has been able\par to sleep at night and walk smoothly. knee still occ gives out on him \par 7/20 cont to have sig pain but inj helped - all medial pain -- \par 10/19/21: f/u LT knee, symptoms persist. No significant change. HA in the past with relief.\par 12/21/21: No change in symptoms from last visit.\par 1/18/22: Cont pain, no auth yet for HA injections.\par 3/1/22: Cont pain.\par 3/15/22: Orthovisc #1 left knee.\par 4/5/22: Orthovisc #4 left knee, some improvement overall.\par 5/17/22: HA injections helped but still has pain.\par 9/20/22: Had about 4 months relief from CSI  \par 10/18/22: Cortisone gave 1 week relief then pain returned.\par 11/15/22: Orthovisc L KNEE #1\par 11/22/22: Orthovisc #2 left knee.\par 11/29/22: Orthovisc #3 left knee.\par 12/6/22: Orthovisc #4 left knee. [] : no [FreeTextEntry1] : left knee  [FreeTextEntry3] : 02/27/13 [FreeTextEntry5] : SANDRA is here today for left knee pain. pt states gel injections gave him relief, no catching, locking.

## 2023-01-03 NOTE — ASSESSMENT
[FreeTextEntry1] : Previous doc:\par Adv OA left knee. Failed conservative treatments. BMI 53 today (340 lbs) - needs to be < 300 lbs (BMI < 40 would be 250). Had lap band in 2003 and is open to exploring gastric sleeve.\par 4/30/19: Continued pain - working on getting date for bariatrics. For now cont duexis prn and will try tramadol.\par Cortisone inj in the past have only helped for a few days at a time.\par 5/28/19: Doing well with weight loss and planning for removal of gastric band and may need 2 stage for sleeve.\par 6/25/19: Stable for now - planning for bariatrics end of next month - he will be unable to work for a while due to this.\par 9/24/19: Cont pain - working on weight loss (293 lbs today). Injections in the past only a few days relief so will hold on this.\par 12/17/19: Worsening pain. Weight down to 280s now and BMI is < 45 - will get auth for TKA. Has psoriasis but never affecting left knee.\par 1/14/20: Cont difficulty and waiting for left TKA auth.\par 2/11/20: No change - TKA was denied but has appeal hearing in 2 weeks. Cont weight loss.\par 5/13/20: continues to work on weight loss. His Pain and loss of of function continues to progress. He has to take pain medication on a daily basis due how severe his pain is. At this time i believe the benefits outweigh the risks. He will continue on weight loss and our goal of TKA. Refilled his duexis.\par 8/11/20: No change - has deposition scheduled in Aug.\par 9/22/20: No change - currently 330 lbs and discussed need to be < 300 lbs for TKA. Awaiting auth. At one point was 280 lbs but gained some back during pandemic.\par 11/3/20: TKA authorized but unable to proceed as he is struggling with weight loss. BMI too high to proceed with\par surgery and needs to be < 300, ideally 280s to have TKA.\par 12/15/20: Continued pain - No change in weight. Will continue to work on weight loss and continue with home\par exercises. Cortisone injection today.\par 3/16/21: Still with significant pain - weight loss has not progressed and he has been around 345 the past few months -\par no overall loss from bariatrics. Not a TKA candidate at this time due to weight. Will try orthovisc again.\par 4/16/21: Inj tolerated well - asp 5cc.\par 4/27/21: Inj tolerated well.\par 5/3/21: Inj tolerated well.\par 5/11/21: Inj tolerated well.\par 6/8/21 he has slow wt loss down to 337 - dealing with neuropathy and DM -some relief with Inj and he is concerned he may be disabled\par y \par 7/20/21 he is unable to retrun to work due to pain but did have some relief with Knee inj - cont o struggle with wt loss\par 10/19/21: Symptoms unchanged, he has responded to orthovisc and will make another request as he can restart series early-mid November 2021. Weight loss discussed. He will continue OOW.\par 12/21/21: No change in symptoms. Will discuss "granted w prejudice" with WC as clarification is needed of what this means. f/up in 4 weeks. He will continue OOW\par 1/18/22: No change - will start HA injections when aurthorized. Only has granted w prejudice at this time.\par 3/1/22: No auth required to start orthovisc - he is going away next week and would like to start the series when he returns.\par 3/15/22: Inj tolerated well.\par 4/5/22: Inj tolerated well, reeval in 6 weeks.\par 5/17/22: HA injections helped but still with significant pain levels - cortisone inj today tolerated well.  He is very frustrated with this situation and that he has had such difficulty with weight loss.\par 9/20/22: Repeat cortisone inj today tolreated well, will reeval in 1 month for possible HA injections again.\par 10/18/22: Cortisone only 1 week relief - request auth for orthovisc as this helped him earlier this year.  Not a surgical candidate and pain limits his ability to return to work.\par 11/15/22: Inj tolerated well.\par 11/22/22: Inj tolerated well.\par 11/29/22: Inj tolerated well.\par 12/6/22: Inj tolerated well.\par \par 01/03/23: Doing ok since injections.  Cont HEP, weight loss.

## 2023-01-03 NOTE — DISCUSSION/SUMMARY
[de-identified] : The patient was advised of the diagnosis.  The natural history of the pathology was explained in full to the patient in layman's terms. All questions were answered.  The risks and benefits of surgical and non-surgical treatment alternatives were explained in full to the patient.\par

## 2023-01-04 ENCOUNTER — APPOINTMENT (OUTPATIENT)
Dept: ORTHOPEDIC SURGERY | Facility: CLINIC | Age: 63
End: 2023-01-04
Payer: OTHER MISCELLANEOUS

## 2023-01-04 VITALS — WEIGHT: 315 LBS | BODY MASS INDEX: 49.44 KG/M2 | HEIGHT: 67 IN

## 2023-01-04 PROCEDURE — 99214 OFFICE O/P EST MOD 30 MIN: CPT

## 2023-01-04 PROCEDURE — 99072 ADDL SUPL MATRL&STAF TM PHE: CPT

## 2023-01-04 NOTE — HISTORY OF PRESENT ILLNESS
[8] : 8 [5] : 5 [de-identified] : Patient has persistent symptoms in the shoulder, but tolerable. Pain with reaching over head, behind back and sleeping. \par  [] : yes [FreeTextEntry1] : right shoulder  [de-identified] : stretching

## 2023-01-04 NOTE — PHYSICAL EXAM
[Right] : right shoulder [Sitting] : sitting [Mild] : mild [] : decreased sensation around incision [TWNoteComboBox7] : active forward flexion 140 degrees [TWNoteComboBox6] : internal rotation L5 [de-identified] : external rotation 50 degrees

## 2023-01-04 NOTE — DISCUSSION/SUMMARY
[Medication Risks Reviewed] : Medication risks reviewed [de-identified] : Patient allowed to gently start resuming activities. \par Discussed change to medication prescription and usage. \par Mod activity as needd\par  poss csi in future\par \par

## 2023-02-06 ENCOUNTER — APPOINTMENT (OUTPATIENT)
Dept: ORTHOPEDIC SURGERY | Facility: CLINIC | Age: 63
End: 2023-02-06
Payer: OTHER MISCELLANEOUS

## 2023-02-06 VITALS — BODY MASS INDEX: 49.44 KG/M2 | HEIGHT: 67 IN | WEIGHT: 315 LBS

## 2023-02-06 PROCEDURE — 99214 OFFICE O/P EST MOD 30 MIN: CPT

## 2023-02-06 PROCEDURE — 99072 ADDL SUPL MATRL&STAF TM PHE: CPT

## 2023-02-06 RX ORDER — FAMOTIDINE 40 MG/1
40 TABLET, FILM COATED ORAL DAILY
Qty: 90 | Refills: 0 | Status: ACTIVE | COMMUNITY
Start: 2022-05-17 | End: 1900-01-01

## 2023-02-06 RX ORDER — IBUPROFEN 800 MG/1
800 TABLET, FILM COATED ORAL
Qty: 270 | Refills: 2 | Status: ACTIVE | COMMUNITY
Start: 2022-05-17 | End: 1900-01-01

## 2023-02-06 NOTE — HISTORY OF PRESENT ILLNESS
[5] : 5 [Dull/Aching] : dull/aching [Localized] : localized [Throbbing] : throbbing [de-identified] : Patient has persistent symptoms in the right knee, pain with walking, stairs, stiffness, no mechanical symptoms. He is due to repeat Orthovisc but is trying to hold out. Working on weight loss. Left knee is worse than the right.  [] : no [FreeTextEntry1] : right knee  [de-identified] : none

## 2023-03-07 ENCOUNTER — APPOINTMENT (OUTPATIENT)
Dept: ORTHOPEDIC SURGERY | Facility: CLINIC | Age: 63
End: 2023-03-07
Payer: OTHER MISCELLANEOUS

## 2023-03-07 VITALS — HEIGHT: 67 IN | WEIGHT: 315 LBS | BODY MASS INDEX: 49.44 KG/M2

## 2023-03-07 PROCEDURE — 99214 OFFICE O/P EST MOD 30 MIN: CPT

## 2023-03-07 PROCEDURE — 99072 ADDL SUPL MATRL&STAF TM PHE: CPT

## 2023-03-07 NOTE — HISTORY OF PRESENT ILLNESS
[Work related] : work related [8] : 8 [5] : 5 [Dull/Aching] : dull/aching [Localized] : localized [Throbbing] : throbbing [de-identified] : Patient has persistent symptoms in the shoulder, but tolerable. Pain with reaching over head, behind back and sleeping..WRI 1/15/18 \par  [] : Post Surgical Visit: no [FreeTextEntry1] : right shoulder  [FreeTextEntry3] : 1/15/2018 [FreeTextEntry5] : Shoulder pain hasn't gotten any better [de-identified] : stretching

## 2023-03-07 NOTE — DISCUSSION/SUMMARY
[Medication Risks Reviewed] : Medication risks reviewed [de-identified] : Patient allowed to gently start resuming activities. \par Discussed change to medication prescription and usage. \par Mod activity as needd\par  poss csi in future\par \par

## 2023-03-07 NOTE — PHYSICAL EXAM
[Right] : right shoulder [Sitting] : sitting [Mild] : mild [] : decreased sensation around incision [TWNoteComboBox7] : active forward flexion 165 degrees [TWNoteComboBox6] : internal rotation 20 degrees [de-identified] : external rotation 50 degrees

## 2023-03-08 ENCOUNTER — APPOINTMENT (OUTPATIENT)
Dept: ORTHOPEDIC SURGERY | Facility: CLINIC | Age: 63
End: 2023-03-08

## 2023-03-14 ENCOUNTER — APPOINTMENT (OUTPATIENT)
Dept: ORTHOPEDIC SURGERY | Facility: CLINIC | Age: 63
End: 2023-03-14
Payer: OTHER MISCELLANEOUS

## 2023-03-14 VITALS — WEIGHT: 315 LBS | BODY MASS INDEX: 49.44 KG/M2 | HEIGHT: 67 IN

## 2023-03-14 PROCEDURE — J3490M: CUSTOM

## 2023-03-14 PROCEDURE — 99214 OFFICE O/P EST MOD 30 MIN: CPT | Mod: 25

## 2023-03-14 PROCEDURE — 99072 ADDL SUPL MATRL&STAF TM PHE: CPT | Mod: ACP

## 2023-03-14 PROCEDURE — 99214 OFFICE O/P EST MOD 30 MIN: CPT | Mod: 25,ACP

## 2023-03-14 PROCEDURE — 99072 ADDL SUPL MATRL&STAF TM PHE: CPT

## 2023-03-14 PROCEDURE — 20611 DRAIN/INJ JOINT/BURSA W/US: CPT | Mod: LT

## 2023-03-14 PROCEDURE — 20611 DRAIN/INJ JOINT/BURSA W/US: CPT | Mod: ACP,LT

## 2023-03-14 PROCEDURE — J3490M: CUSTOM | Mod: ACP

## 2023-03-14 NOTE — ASSESSMENT
[FreeTextEntry1] : Previous doc:\par Adv OA left knee. Failed conservative treatments. BMI 53 today (340 lbs) - needs to be < 300 lbs (BMI < 40 would be 250). Had lap band in 2003 and is open to exploring gastric sleeve.\par 4/30/19: Continued pain - working on getting date for bariatrics. For now cont duexis prn and will try tramadol.\par Cortisone inj in the past have only helped for a few days at a time.\par 5/28/19: Doing well with weight loss and planning for removal of gastric band and may need 2 stage for sleeve.\par 6/25/19: Stable for now - planning for bariatrics end of next month - he will be unable to work for a while due to this.\par 9/24/19: Cont pain - working on weight loss (293 lbs today). Injections in the past only a few days relief so will hold on this.\par 12/17/19: Worsening pain. Weight down to 280s now and BMI is < 45 - will get auth for TKA. Has psoriasis but never affecting left knee.\par 1/14/20: Cont difficulty and waiting for left TKA auth.\par 2/11/20: No change - TKA was denied but has appeal hearing in 2 weeks. Cont weight loss.\par 5/13/20: continues to work on weight loss. His Pain and loss of of function continues to progress. He has to take pain medication on a daily basis due how severe his pain is. At this time i believe the benefits outweigh the risks. He will continue on weight loss and our goal of TKA. Refilled his duexis.\par 8/11/20: No change - has deposition scheduled in Aug.\par 9/22/20: No change - currently 330 lbs and discussed need to be < 300 lbs for TKA. Awaiting auth. At one point was 280 lbs but gained some back during pandemic.\par 11/3/20: TKA authorized but unable to proceed as he is struggling with weight loss. BMI too high to proceed with\par surgery and needs to be < 300, ideally 280s to have TKA.\par 12/15/20: Continued pain - No change in weight. Will continue to work on weight loss and continue with home\par exercises. Cortisone injection today.\par 3/16/21: Still with significant pain - weight loss has not progressed and he has been around 345 the past few months -\par no overall loss from bariatrics. Not a TKA candidate at this time due to weight. Will try orthovisc again.\par 4/16/21: Inj tolerated well - asp 5cc.\par 4/27/21: Inj tolerated well.\par 5/3/21: Inj tolerated well.\par 5/11/21: Inj tolerated well.\par 6/8/21 he has slow wt loss down to 337 - dealing with neuropathy and DM -some relief with Inj and he is concerned he may be disabled\par y \par 7/20/21 he is unable to retrun to work due to pain but did have some relief with Knee inj - cont o struggle with wt loss\par 10/19/21: Symptoms unchanged, he has responded to orthovisc and will make another request as he can restart series early-mid November 2021. Weight loss discussed. He will continue OOW.\par 12/21/21: No change in symptoms. Will discuss "granted w prejudice" with WC as clarification is needed of what this means. f/up in 4 weeks. He will continue OOW\par 1/18/22: No change - will start HA injections when aurthorized. Only has granted w prejudice at this time.\par 3/1/22: No auth required to start orthovisc - he is going away next week and would like to start the series when he returns.\par 3/15/22: Inj tolerated well.\par 4/5/22: Inj tolerated well, reeval in 6 weeks.\par 5/17/22: HA injections helped but still with significant pain levels - cortisone inj today tolerated well.  He is very frustrated with this situation and that he has had such difficulty with weight loss.\par 9/20/22: Repeat cortisone inj today tolreated well, will reeval in 1 month for possible HA injections again.\par 10/18/22: Cortisone only 1 week relief - request auth for orthovisc as this helped him earlier this year.  Not a surgical candidate and pain limits his ability to return to work.\par 11/15/22: Inj tolerated well.\par 11/22/22: Inj tolerated well.\par 11/29/22: Inj tolerated well.\par 12/6/22: Inj tolerated well.\par 01/03/23: Doing ok since injections.  Cont HEP, weight loss.\par \par 3/14/23: Worsening pain - cortisone inj today tolerated well.

## 2023-03-14 NOTE — HISTORY OF PRESENT ILLNESS
[9] : 9 [6] : 6 [Localized] : localized [Stabbing] : stabbing [Meds] : meds [Not working due to injury] : Work status: not working due to injury [de-identified] : 3/14/23: Worsening pain recently.\par \par Previous doc:\par WC 2/27/13.\par Left knee pain for several years, fell at work sustaining meniscus tear. Prior to this had scope in 2007 with some relief.\par Over the years progressive worsening pain. had success in the past with HA and cortisone injections but this past year\par had less relief.. Cortisone inj 2 weeks ago only helped a few days. Lap band 2003 with 40 lb weight loss. Mult diets in\par the past always with  lb loss but then gains back.\par 4/30/19: Continued pain, started seeing bariatric surgeon and is on meal replacement plan. Down to 334 lbs.\par 5/28/19: Cont pain - lost another 10 lbs since last visit. Seeing bariatrics - may need 2 stage procedure to remove\par band and then get sleeve.\par 6/25/19: Cont pain - had to stop NSAIDs because of upcoming bariatric surgery. Taking Tylenol currently with very little\par relief. Surgery tentatively scheduled 7/24.\par 9/24/19: Cont pain. Had bariatic surgery in July.\par 12/9/19 Orthovisc #2 right knee.\par 12/17/19: Worsening pain - down to 287 lbs.\par 1/14/20: Cont pain, no change in weight.\par 2/11/20: TKA was denied, has hearing in 2 weeks. No change in weight recently.\par 5/13/20: Patient continues to having worsening pain. Has difficulty sleeping. His pain affects his daily life. Reports no\par change in weight since last visit. Would like a refill on his Duexis. Patient is not currently working due to COVID-19. \par 8/11/20: Cont pain, awaiting auth for TKA but deposition was rescheduled. now for 8/31?\par 9/22/20: No change - waiting for TKA auth.\par 11/3/20: Cont pain, TKA was authorized but struggling with weight loss.\par 12/15/20: Worsening left knee pain. Has been unsuccessful with weight loss. Started Ozympic this week.\par 3/16/21: Cont knee pain. Cortisone inj in Dec helped for 1-2 weeks.\par 4/16/21: Orthovisc #1 left knee.\par 4/27/21: Orthovisc #2 left knee.\par 5/4/21: Orthovisc #3 left knee.\par 5/11/21: Orthovisc #4 left knee.\par 6/8/21: Since last injection, he has had dull pain in left knee and does not have any clicking in left knee. Has been able\par to sleep at night and walk smoothly. knee still occ gives out on him \par 7/20 cont to have sig pain but inj helped - all medial pain -- \par 10/19/21: f/u LT knee, symptoms persist. No significant change. HA in the past with relief.\par 12/21/21: No change in symptoms from last visit.\par 1/18/22: Cont pain, no auth yet for HA injections.\par 3/1/22: Cont pain.\par 3/15/22: Orthovisc #1 left knee.\par 4/5/22: Orthovisc #4 left knee, some improvement overall.\par 5/17/22: HA injections helped but still has pain.\par 9/20/22: Had about 4 months relief from CSI  \par 10/18/22: Cortisone gave 1 week relief then pain returned.\par 11/15/22: Orthovisc L KNEE #1\par 11/22/22: Orthovisc #2 left knee.\par 11/29/22: Orthovisc #3 left knee.\par 12/6/22: Orthovisc #4 left knee.\par 01/03/23: HA injections helped but still has pain. [] : no [FreeTextEntry1] : L knee [FreeTextEntry3] : 2/27/2013 [FreeTextEntry5] : WILL L knee. Pt states pain has worsened since last visit. Pt states pain now keep him up at night. \par WC DOI : 2/27/2013 [FreeTextEntry9] : Jorden  [de-identified] : Jorden

## 2023-03-14 NOTE — DISCUSSION/SUMMARY
[de-identified] : The natural progression of Osteoarthritis was explained to the patient.  We discussed the possible treatment options from conservative to operative.  These included NSAIDS, Glucosamine and Chondrotin sulfate, Physical Therapy and injections.  We also discussed that at some point they may progress to needing a TKA.\par

## 2023-03-14 NOTE — WORK
[Total (100%)] : total (100%) [Does not reveal pre-existing condition(s) that may affect treatment/prognosis] : does not reveal pre-existing condition(s) that may affect treatment/prognosis [Cannot return to work because ________] : cannot return to work because [unfilled] [Unknown at this time] : : unknown at this time [Patient] : patient [No Rx restrictions] : No Rx restrictions. [I provided the services listed above] :  I provided the services listed above. [FreeTextEntry1] : guarded

## 2023-03-14 NOTE — IMAGING
[de-identified] : Left knee:\par pain med and lateral joint line \par Crepitus\par NIVI\par Strenth 5/5\par Pulses +2 DP/PT\par Decreased ROM -\par

## 2023-03-20 ENCOUNTER — APPOINTMENT (OUTPATIENT)
Dept: ORTHOPEDIC SURGERY | Facility: CLINIC | Age: 63
End: 2023-03-20
Payer: OTHER MISCELLANEOUS

## 2023-03-20 VITALS — HEIGHT: 67 IN | BODY MASS INDEX: 49.44 KG/M2 | WEIGHT: 315 LBS

## 2023-03-20 PROCEDURE — 99214 OFFICE O/P EST MOD 30 MIN: CPT

## 2023-03-20 PROCEDURE — 99072 ADDL SUPL MATRL&STAF TM PHE: CPT

## 2023-03-20 NOTE — HISTORY OF PRESENT ILLNESS
[5] : 5 [Localized] : localized [Stabbing] : stabbing [Work related] : work related [Dull/Aching] : dull/aching [Throbbing] : throbbing [de-identified] : Patient has persistent symptoms in the right knee, pain with walking, stairs, stiffness, no mechanical symptoms. He is due to repeat Orthovisc but is trying to hold out. Working on weight loss. Left knee is worse than the right. He is doing ok with R knee [] : Post Surgical Visit: no [FreeTextEntry1] : right knee  [FreeTextEntry3] : 1/15/2018 [FreeTextEntry5] : Shoulder pain hasn't gotten any better [de-identified] : none

## 2023-03-20 NOTE — PHYSICAL EXAM
[Right] : right knee [5___] : hamstring 5[unfilled]/5 [TWNoteComboBox7] : False [TWNoteComboBox6] : False [de-identified] : False [] : no medial joint line tenderness

## 2023-05-09 ENCOUNTER — APPOINTMENT (OUTPATIENT)
Dept: ORTHOPEDIC SURGERY | Facility: CLINIC | Age: 63
End: 2023-05-09
Payer: OTHER MISCELLANEOUS

## 2023-05-09 VITALS — WEIGHT: 315 LBS | HEIGHT: 67 IN | BODY MASS INDEX: 49.44 KG/M2

## 2023-05-09 PROCEDURE — 99214 OFFICE O/P EST MOD 30 MIN: CPT

## 2023-05-09 NOTE — WORK
[Total (100%)] : total (100%) [Does not reveal pre-existing condition(s) that may affect treatment/prognosis] : does not reveal pre-existing condition(s) that may affect treatment/prognosis [Patient] : patient [No Rx restrictions] : No Rx restrictions. [I provided the services listed above] :  I provided the services listed above. [FreeTextEntry1] : poor

## 2023-05-09 NOTE — DISCUSSION/SUMMARY
[de-identified] : requets comp auth for orthovisc R knee, MG-2 they did help him for over 6 months in the past\par modify activities\par use elastic sleeve\par try OTC meds\par ice as needed\par

## 2023-05-09 NOTE — HISTORY OF PRESENT ILLNESS
[6] : 6 [4] : 4 [] : yes [FreeTextEntry1] : right knee  [de-identified] : ibuprofen used as needed

## 2023-05-24 ENCOUNTER — APPOINTMENT (OUTPATIENT)
Dept: ORTHOPEDIC SURGERY | Facility: CLINIC | Age: 63
End: 2023-05-24
Payer: OTHER MISCELLANEOUS

## 2023-05-24 ENCOUNTER — RX RENEWAL (OUTPATIENT)
Age: 63
End: 2023-05-24

## 2023-05-24 VITALS — BODY MASS INDEX: 49.44 KG/M2 | HEIGHT: 67 IN | WEIGHT: 315 LBS

## 2023-05-24 DIAGNOSIS — S43.431D SUPERIOR GLENOID LABRUM LESION OF RIGHT SHOULDER, SUBSEQUENT ENCOUNTER: ICD-10-CM

## 2023-05-24 PROCEDURE — 99455 WORK RELATED DISABILITY EXAM: CPT

## 2023-05-24 NOTE — DISCUSSION/SUMMARY
[Medication Risks Reviewed] : Medication risks reviewed [de-identified] : Patient allowed to gently start resuming activities. \par Discussed change to medication prescription and usage. \par Mod activity as needed.\par \par \par

## 2023-05-24 NOTE — PHYSICAL EXAM
[Right] : right shoulder [Sitting] : sitting [Mild] : mild [] : decreased sensation around incision [TWNoteComboBox7] : active forward flexion 165 degrees [TWNoteComboBox6] : internal rotation 20 degrees [de-identified] : external rotation 25 degrees

## 2023-05-24 NOTE — WORK
[Has the patient reached Maximum Medical Improvement? If yes, indicate date___] : Yes, on [unfilled] [Is there permanent partial impairment?] : Yes [Right] : right [FreeTextEntry7] : shoulder  [FreeTextEntry8] : int rot 20 deg, ext ro 25 deg [FreeTextEntry5] : 32.5 [de-identified] : measured with gonio 3 times, clinica a-c separation 7.5%, rom 25%

## 2023-05-24 NOTE — HISTORY OF PRESENT ILLNESS
[7] : 7 [4] : 4 [Sleep] : sleep [] : yes [de-identified] : Patient has persistent symptoms in the shoulder, but tolerable. Pain with reaching over head, behind back and sleeping, has dec motion..WRI 1/15/18 \par  [FreeTextEntry1] : right shoulder  [de-identified] : ibuprofen 800mg used as needed

## 2023-05-31 ENCOUNTER — APPOINTMENT (OUTPATIENT)
Dept: ORTHOPEDIC SURGERY | Facility: CLINIC | Age: 63
End: 2023-05-31
Payer: OTHER MISCELLANEOUS

## 2023-05-31 PROCEDURE — 20611 DRAIN/INJ JOINT/BURSA W/US: CPT | Mod: RT

## 2023-05-31 PROCEDURE — 99213 OFFICE O/P EST LOW 20 MIN: CPT | Mod: 25

## 2023-05-31 NOTE — HISTORY OF PRESENT ILLNESS
[de-identified] : Patient returns for ORthovisc #1 right knee. Has known OA, worse when more active

## 2023-05-31 NOTE — HISTORY OF PRESENT ILLNESS
[de-identified] : Patient returns for ORthovisc #1 right knee. Has known OA, worse when more active

## 2023-05-31 NOTE — WORK
The patient is pleased with the results of cataract surgery in the first eye and wishes to proceed with cataract surgery in the fellow eye. Again, the risks, benefits and alternatives to cataract surgery were reviewed in detail. [Total (100%)] : total (100%) [Does not reveal pre-existing condition(s) that may affect treatment/prognosis] : does not reveal pre-existing condition(s) that may affect treatment/prognosis [Cannot return to work because ________] : cannot return to work because [unfilled] [Unknown at this time] : : unknown at this time [Patient] : patient [No Rx restrictions] : No Rx restrictions. [I provided the services listed above] :  I provided the services listed above. [FreeTextEntry1] : poor

## 2023-05-31 NOTE — PROCEDURE
[FreeTextEntry3] : Orthovisc (Large Joint) with Ultrasound Guidance\par Viscosupplementation Injection: X-ray evidence of Osteoarthritis on this or prior visit and Patient has tried OTC's including aspirin, Ibuprofen, Aleve etc or prescription NSAIDS, and/or exercises at home and/ or physical therapy without satisfactory response. \par An injection of Orthovisc 2ml #1 was injected into the right knee(s). after verbal consent using sterile technique. The risks, benefits, and alternatives to Viscosupplementation injection were explained in full to the patient. Risks outlined include but are not limited to infection, sepsis, bleeding, scarring, skin discoloration, temporary increase in pain, syncopal episode, failure to resolve symptoms, allergic reaction, and symptom recurrence. Signs and symptoms of infection reviewed and patient advised to call immediately for redness, fevers, and/or chills. Patient understood the risks. All questions were answered. After discussion of options, patient requested Viscosupplementation. Oral informed consent was obtained and sterile prep was done of the injection site. Sterile technique was used without complications. The patient tolerated the procedure well. Ice tonight to the injection site. \par \par Ultrasound Guidance was used for the following reasons: prior failure or difficult injection. \par \par Ultrasound guided injection was performed of the knee, visualization of the needle and placement of injection was performed without complication. \par

## 2023-05-31 NOTE — WORK
[Total (100%)] : total (100%) [Does not reveal pre-existing condition(s) that may affect treatment/prognosis] : does not reveal pre-existing condition(s) that may affect treatment/prognosis [Cannot return to work because ________] : cannot return to work because [unfilled] [Unknown at this time] : : unknown at this time [Patient] : patient [No Rx restrictions] : No Rx restrictions. [I provided the services listed above] :  I provided the services listed above. [FreeTextEntry1] : poor

## 2023-05-31 NOTE — DISCUSSION/SUMMARY
[Medication Risks Reviewed] : Medication risks reviewed [de-identified] : rest, ice, activity modification.

## 2023-05-31 NOTE — DISCUSSION/SUMMARY
[Medication Risks Reviewed] : Medication risks reviewed [de-identified] : rest, ice, activity modification.

## 2023-06-07 ENCOUNTER — APPOINTMENT (OUTPATIENT)
Dept: ORTHOPEDIC SURGERY | Facility: CLINIC | Age: 63
End: 2023-06-07
Payer: OTHER MISCELLANEOUS

## 2023-06-07 PROCEDURE — 20611 DRAIN/INJ JOINT/BURSA W/US: CPT | Mod: RT

## 2023-06-07 PROCEDURE — 99212 OFFICE O/P EST SF 10 MIN: CPT | Mod: 25

## 2023-06-07 NOTE — HISTORY OF PRESENT ILLNESS
[2] : 2 [Orthovisc] : Orthovisc [de-identified] : Patient returns for ORthovisc #2 right knee. Has known OA, worse when more active [] : no [de-identified] : 05/31/2023 [de-identified] : right knee  DISPLAY PLAN FREE TEXT DISPLAY PLAN FREE TEXT DISPLAY PLAN FREE TEXT DISPLAY PLAN FREE TEXT

## 2023-06-07 NOTE — DISCUSSION/SUMMARY
[Medication Risks Reviewed] : Medication risks reviewed [de-identified] : rest, ice, activity modification.

## 2023-06-07 NOTE — PROCEDURE
[FreeTextEntry3] : Orthovisc (Large Joint) with Ultrasound Guidance\par Viscosupplementation Injection: X-ray evidence of Osteoarthritis on this or prior visit and Patient has tried OTC's including aspirin, Ibuprofen, Aleve etc or prescription NSAIDS, and/or exercises at home and/ or physical therapy without satisfactory response. \par An injection of Orthovisc 2ml #2 was injected into the right knee(s). after verbal consent using sterile technique. The risks, benefits, and alternatives to Viscosupplementation injection were explained in full to the patient. Risks outlined include but are not limited to infection, sepsis, bleeding, scarring, skin discoloration, temporary increase in pain, syncopal episode, failure to resolve symptoms, allergic reaction, and symptom recurrence. Signs and symptoms of infection reviewed and patient advised to call immediately for redness, fevers, and/or chills. Patient understood the risks. All questions were answered. After discussion of options, patient requested Viscosupplementation. Oral informed consent was obtained and sterile prep was done of the injection site. Sterile technique was used without complications. The patient tolerated the procedure well. Ice tonight to the injection site. \par \par Ultrasound Guidance was used for the following reasons: prior failure or difficult injection. \par \par Ultrasound guided injection was performed of the knee, visualization of the needle and placement of injection was performed without complication. \par

## 2023-06-14 ENCOUNTER — APPOINTMENT (OUTPATIENT)
Dept: ORTHOPEDIC SURGERY | Facility: CLINIC | Age: 63
End: 2023-06-14
Payer: OTHER MISCELLANEOUS

## 2023-06-14 DIAGNOSIS — M19.111 POST-TRAUMATIC OSTEOARTHRITIS, RIGHT SHOULDER: ICD-10-CM

## 2023-06-14 PROCEDURE — 20611 DRAIN/INJ JOINT/BURSA W/US: CPT | Mod: RT

## 2023-06-14 PROCEDURE — 99212 OFFICE O/P EST SF 10 MIN: CPT | Mod: 25

## 2023-06-14 NOTE — DISCUSSION/SUMMARY
[Medication Risks Reviewed] : Medication risks reviewed [de-identified] : rest, ice, activity modification.

## 2023-06-14 NOTE — PROCEDURE
[FreeTextEntry3] : Orthovisc (Large Joint) with Ultrasound Guidance\par Viscosupplementation Injection: X-ray evidence of Osteoarthritis on this or prior visit and Patient has tried OTC's including aspirin, Ibuprofen, Aleve etc or prescription NSAIDS, and/or exercises at home and/ or physical therapy without satisfactory response. \par An injection of Orthovisc 2ml #3 was injected into the right knee(s). after verbal consent using sterile technique. The risks, benefits, and alternatives to Viscosupplementation injection were explained in full to the patient. Risks outlined include but are not limited to infection, sepsis, bleeding, scarring, skin discoloration, temporary increase in pain, syncopal episode, failure to resolve symptoms, allergic reaction, and symptom recurrence. Signs and symptoms of infection reviewed and patient advised to call immediately for redness, fevers, and/or chills. Patient understood the risks. All questions were answered. After discussion of options, patient requested Viscosupplementation. Oral informed consent was obtained and sterile prep was done of the injection site. Sterile technique was used without complications. The patient tolerated the procedure well. Ice tonight to the injection site. \par \par Ultrasound Guidance was used for the following reasons: prior failure or difficult injection. \par \par Ultrasound guided injection was performed of the knee, visualization of the needle and placement of injection was performed without complication. \par

## 2023-06-14 NOTE — HISTORY OF PRESENT ILLNESS
[3] : 3 [Orthovisc] : Orthovisc [de-identified] : Patient returns for ORthovisc #3 right knee. Has known OA, worse when more active [] : no [de-identified] : 06/07/2023 [de-identified] : right knee

## 2023-06-16 ENCOUNTER — APPOINTMENT (OUTPATIENT)
Dept: ORTHOPEDIC SURGERY | Facility: CLINIC | Age: 63
End: 2023-06-16
Payer: OTHER MISCELLANEOUS

## 2023-06-16 VITALS — WEIGHT: 315 LBS | BODY MASS INDEX: 49.44 KG/M2 | HEIGHT: 67 IN

## 2023-06-16 PROCEDURE — 99214 OFFICE O/P EST MOD 30 MIN: CPT | Mod: 25

## 2023-06-16 PROCEDURE — J3490M: CUSTOM

## 2023-06-16 PROCEDURE — 20611 DRAIN/INJ JOINT/BURSA W/US: CPT | Mod: LT

## 2023-06-16 NOTE — HISTORY OF PRESENT ILLNESS
[9] : 9 [6] : 6 [Localized] : localized [Stabbing] : stabbing [Meds] : meds [Not working due to injury] : Work status: not working due to injury [de-identified] : 6/16/23: Cortisone helped for a couple of weeks.\par \par Previous doc:\par WC 2/27/13.\par Left knee pain for several years, fell at work sustaining meniscus tear. Prior to this had scope in 2007 with some relief.\par Over the years progressive worsening pain. had success in the past with HA and cortisone injections but this past year\par had less relief.. Cortisone inj 2 weeks ago only helped a few days. Lap band 2003 with 40 lb weight loss. Mult diets in\par the past always with  lb loss but then gains back.\par 4/30/19: Continued pain, started seeing bariatric surgeon and is on meal replacement plan. Down to 334 lbs.\par 5/28/19: Cont pain - lost another 10 lbs since last visit. Seeing bariatrics - may need 2 stage procedure to remove\par band and then get sleeve.\par 6/25/19: Cont pain - had to stop NSAIDs because of upcoming bariatric surgery. Taking Tylenol currently with very little\par relief. Surgery tentatively scheduled 7/24.\par 9/24/19: Cont pain. Had bariatic surgery in July.\par 12/9/19 Orthovisc #2 right knee.\par 12/17/19: Worsening pain - down to 287 lbs.\par 1/14/20: Cont pain, no change in weight.\par 2/11/20: TKA was denied, has hearing in 2 weeks. No change in weight recently.\par 5/13/20: Patient continues to having worsening pain. Has difficulty sleeping. His pain affects his daily life. Reports no\par change in weight since last visit. Would like a refill on his Duexis. Patient is not currently working due to COVID-19. \par 8/11/20: Cont pain, awaiting auth for TKA but deposition was rescheduled. now for 8/31?\par 9/22/20: No change - waiting for TKA auth.\par 11/3/20: Cont pain, TKA was authorized but struggling with weight loss.\par 12/15/20: Worsening left knee pain. Has been unsuccessful with weight loss. Started Ozympic this week.\par 3/16/21: Cont knee pain. Cortisone inj in Dec helped for 1-2 weeks.\par 4/16/21: Orthovisc #1 left knee.\par 4/27/21: Orthovisc #2 left knee.\par 5/4/21: Orthovisc #3 left knee.\par 5/11/21: Orthovisc #4 left knee.\par 6/8/21: Since last injection, he has had dull pain in left knee and does not have any clicking in left knee. Has been able\par to sleep at night and walk smoothly. knee still occ gives out on him \par 7/20 cont to have sig pain but inj helped - all medial pain -- \par 10/19/21: f/u LT knee, symptoms persist. No significant change. HA in the past with relief.\par 12/21/21: No change in symptoms from last visit.\par 1/18/22: Cont pain, no auth yet for HA injections.\par 3/1/22: Cont pain.\par 3/15/22: Orthovisc #1 left knee.\par 4/5/22: Orthovisc #4 left knee, some improvement overall.\par 5/17/22: HA injections helped but still has pain.\par 9/20/22: Had about 4 months relief from CSI  \par 10/18/22: Cortisone gave 1 week relief then pain returned.\par 11/15/22: Orthovisc L KNEE #1\par 11/22/22: Orthovisc #2 left knee.\par 11/29/22: Orthovisc #3 left knee.\par 12/6/22: Orthovisc #4 left knee.\par 01/03/23: HA injections helped but still has pain.\par 3/14/23: Worsening pain recently. [] : no [FreeTextEntry1] : L knee [FreeTextEntry3] : 2/27/2013 [FreeTextEntry5] : FU L knee. Pt states pain has worsened since last visit. Pt states pain now keep him up at night. Feels the same from last visit. \par WC DOI : 2/27/2013 [FreeTextEntry9] : Jorden  [de-identified] : Jorden

## 2023-06-16 NOTE — IMAGING
[de-identified] : Left knee:\par pain med and lateral joint line \par Crepitus\par NIVI\par Strenth 5/5\par Pulses +2 DP/PT\par Decreased ROM -\par

## 2023-06-16 NOTE — DISCUSSION/SUMMARY
[de-identified] : The natural progression of Osteoarthritis was explained to the patient.  We discussed the possible treatment options from conservative to operative.  These included NSAIDS, Glucosamine and Chondrotin sulfate, Physical Therapy and injections.  We also discussed that at some point they may progress to needing a TKA.\par

## 2023-06-16 NOTE — ASSESSMENT
[FreeTextEntry1] : Previous doc:\par Adv OA left knee. Failed conservative treatments. BMI 53 today (340 lbs) - needs to be < 300 lbs (BMI < 40 would be 250). Had lap band in 2003 and is open to exploring gastric sleeve.\par 4/30/19: Continued pain - working on getting date for bariatrics. For now cont duexis prn and will try tramadol.\par Cortisone inj in the past have only helped for a few days at a time.\par 5/28/19: Doing well with weight loss and planning for removal of gastric band and may need 2 stage for sleeve.\par 6/25/19: Stable for now - planning for bariatrics end of next month - he will be unable to work for a while due to this.\par 9/24/19: Cont pain - working on weight loss (293 lbs today). Injections in the past only a few days relief so will hold on this.\par 12/17/19: Worsening pain. Weight down to 280s now and BMI is < 45 - will get auth for TKA. Has psoriasis but never affecting left knee.\par 1/14/20: Cont difficulty and waiting for left TKA auth.\par 2/11/20: No change - TKA was denied but has appeal hearing in 2 weeks. Cont weight loss.\par 5/13/20: continues to work on weight loss. His Pain and loss of of function continues to progress. He has to take pain medication on a daily basis due how severe his pain is. At this time i believe the benefits outweigh the risks. He will continue on weight loss and our goal of TKA. Refilled his duexis.\par 8/11/20: No change - has deposition scheduled in Aug.\par 9/22/20: No change - currently 330 lbs and discussed need to be < 300 lbs for TKA. Awaiting auth. At one point was 280 lbs but gained some back during pandemic.\par 11/3/20: TKA authorized but unable to proceed as he is struggling with weight loss. BMI too high to proceed with\par surgery and needs to be < 300, ideally 280s to have TKA.\par 12/15/20: Continued pain - No change in weight. Will continue to work on weight loss and continue with home\par exercises. Cortisone injection today.\par 3/16/21: Still with significant pain - weight loss has not progressed and he has been around 345 the past few months -\par no overall loss from bariatrics. Not a TKA candidate at this time due to weight. Will try orthovisc again.\par 4/16/21: Inj tolerated well - asp 5cc.\par 4/27/21: Inj tolerated well.\par 5/3/21: Inj tolerated well.\par 5/11/21: Inj tolerated well.\par 6/8/21 he has slow wt loss down to 337 - dealing with neuropathy and DM -some relief with Inj and he is concerned he may be disabled\par y \par 7/20/21 he is unable to retrun to work due to pain but did have some relief with Knee inj - cont o struggle with wt loss\par 10/19/21: Symptoms unchanged, he has responded to orthovisc and will make another request as he can restart series early-mid November 2021. Weight loss discussed. He will continue OOW.\par 12/21/21: No change in symptoms. Will discuss "granted w prejudice" with WC as clarification is needed of what this means. f/up in 4 weeks. He will continue OOW\par 1/18/22: No change - will start HA injections when aurthorized. Only has granted w prejudice at this time.\par 3/1/22: No auth required to start orthovisc - he is going away next week and would like to start the series when he returns.\par 3/15/22: Inj tolerated well.\par 4/5/22: Inj tolerated well, reeval in 6 weeks.\par 5/17/22: HA injections helped but still with significant pain levels - cortisone inj today tolerated well.  He is very frustrated with this situation and that he has had such difficulty with weight loss.\par 9/20/22: Repeat cortisone inj today tolreated well, will reeval in 1 month for possible HA injections again.\par 10/18/22: Cortisone only 1 week relief - request auth for orthovisc as this helped him earlier this year.  Not a surgical candidate and pain limits his ability to return to work.\par 11/15/22: Inj tolerated well.\par 11/22/22: Inj tolerated well.\par 11/29/22: Inj tolerated well.\par 12/6/22: Inj tolerated well.\par 01/03/23: Doing ok since injections.  Cont HEP, weight loss.\par 3/14/23: Worsening pain - cortisone inj today tolerated well.\par \par 6/16/23: Worsening pain - cortisone inj today and will get auth for orthovisc left knee.

## 2023-06-21 ENCOUNTER — APPOINTMENT (OUTPATIENT)
Dept: ORTHOPEDIC SURGERY | Facility: CLINIC | Age: 63
End: 2023-06-21
Payer: OTHER MISCELLANEOUS

## 2023-06-21 PROCEDURE — 99212 OFFICE O/P EST SF 10 MIN: CPT | Mod: 25

## 2023-06-21 PROCEDURE — 20611 DRAIN/INJ JOINT/BURSA W/US: CPT

## 2023-06-21 NOTE — HISTORY OF PRESENT ILLNESS
[4] : 4 [Orthovisc] : Orthovisc [de-identified] : Patient returns for ORthovisc #4 right knee. Has known OA, worse when more active [] : no [de-identified] : 06/14/2023 [de-identified] : right knee

## 2023-06-21 NOTE — PROCEDURE
[FreeTextEntry3] : Orthovisc (Large Joint) with Ultrasound Guidance\par Viscosupplementation Injection: X-ray evidence of Osteoarthritis on this or prior visit and Patient has tried OTC's including aspirin, Ibuprofen, Aleve etc or prescription NSAIDS, and/or exercises at home and/ or physical therapy without satisfactory response. \par An injection of Orthovisc 2ml #4 was injected into the right knee(s). after verbal consent using sterile technique. The risks, benefits, and alternatives to Viscosupplementation injection were explained in full to the patient. Risks outlined include but are not limited to infection, sepsis, bleeding, scarring, skin discoloration, temporary increase in pain, syncopal episode, failure to resolve symptoms, allergic reaction, and symptom recurrence. Signs and symptoms of infection reviewed and patient advised to call immediately for redness, fevers, and/or chills. Patient understood the risks. All questions were answered. After discussion of options, patient requested Viscosupplementation. Oral informed consent was obtained and sterile prep was done of the injection site. Sterile technique was used without complications. The patient tolerated the procedure well. Ice tonight to the injection site. \par \par Ultrasound Guidance was used for the following reasons: prior failure or difficult injection. \par \par Ultrasound guided injection was performed of the knee, visualization of the needle and placement of injection was performed without complication. \par

## 2023-06-21 NOTE — DISCUSSION/SUMMARY
[Medication Risks Reviewed] : Medication risks reviewed [de-identified] : rest, ice, activity modification.\par \par REturn in 2 months for eval.

## 2023-07-21 ENCOUNTER — APPOINTMENT (OUTPATIENT)
Dept: ORTHOPEDIC SURGERY | Facility: CLINIC | Age: 63
End: 2023-07-21
Payer: OTHER MISCELLANEOUS

## 2023-07-21 VITALS — HEIGHT: 67 IN | WEIGHT: 315 LBS | BODY MASS INDEX: 49.44 KG/M2

## 2023-07-21 PROCEDURE — 20611 DRAIN/INJ JOINT/BURSA W/US: CPT | Mod: LT

## 2023-07-21 PROCEDURE — 99213 OFFICE O/P EST LOW 20 MIN: CPT | Mod: 25

## 2023-07-21 NOTE — DISCUSSION/SUMMARY
[de-identified] : The natural progression of Osteoarthritis was explained to the patient.  We discussed the possible treatment options from conservative to operative.  These included NSAIDS, Glucosamine and Chondrotin sulfate, Physical Therapy and injections.  We also discussed that at some point they may progress to needing a TKA.\par \par Progress note completed by Pati Lopez PA-C.

## 2023-07-21 NOTE — ASSESSMENT
[FreeTextEntry1] : Previous doc:\par Adv OA left knee. Failed conservative treatments. BMI 53 today (340 lbs) - needs to be < 300 lbs (BMI < 40 would be 250). Had lap band in 2003 and is open to exploring gastric sleeve.\par 4/30/19: Continued pain - working on getting date for bariatrics. For now cont duexis prn and will try tramadol.\par Cortisone inj in the past have only helped for a few days at a time.\par 5/28/19: Doing well with weight loss and planning for removal of gastric band and may need 2 stage for sleeve.\par 6/25/19: Stable for now - planning for bariatrics end of next month - he will be unable to work for a while due to this.\par 9/24/19: Cont pain - working on weight loss (293 lbs today). Injections in the past only a few days relief so will hold on this.\par 12/17/19: Worsening pain. Weight down to 280s now and BMI is < 45 - will get auth for TKA. Has psoriasis but never affecting left knee.\par 1/14/20: Cont difficulty and waiting for left TKA auth.\par 2/11/20: No change - TKA was denied but has appeal hearing in 2 weeks. Cont weight loss.\par 5/13/20: continues to work on weight loss. His Pain and loss of of function continues to progress. He has to take pain medication on a daily basis due how severe his pain is. At this time i believe the benefits outweigh the risks. He will continue on weight loss and our goal of TKA. Refilled his duexis.\par 8/11/20: No change - has deposition scheduled in Aug.\par 9/22/20: No change - currently 330 lbs and discussed need to be < 300 lbs for TKA. Awaiting auth. At one point was 280 lbs but gained some back during pandemic.\par 11/3/20: TKA authorized but unable to proceed as he is struggling with weight loss. BMI too high to proceed with\par surgery and needs to be < 300, ideally 280s to have TKA.\par 12/15/20: Continued pain - No change in weight. Will continue to work on weight loss and continue with home\par exercises. Cortisone injection today.\par 3/16/21: Still with significant pain - weight loss has not progressed and he has been around 345 the past few months -\par no overall loss from bariatrics. Not a TKA candidate at this time due to weight. Will try orthovisc again.\par 4/16/21: Inj tolerated well - asp 5cc.\par 4/27/21: Inj tolerated well.\par 5/3/21: Inj tolerated well.\par 5/11/21: Inj tolerated well.\par 6/8/21 he has slow wt loss down to 337 - dealing with neuropathy and DM -some relief with Inj and he is concerned he may be disabled\par y \par 7/20/21 he is unable to retrun to work due to pain but did have some relief with Knee inj - cont o struggle with wt loss\par 10/19/21: Symptoms unchanged, he has responded to orthovisc and will make another request as he can restart series early-mid November 2021. Weight loss discussed. He will continue OOW.\par 12/21/21: No change in symptoms. Will discuss "granted w prejudice" with WC as clarification is needed of what this means. f/up in 4 weeks. He will continue OOW\par 1/18/22: No change - will start HA injections when aurthorized. Only has granted w prejudice at this time.\par 3/1/22: No auth required to start orthovisc - he is going away next week and would like to start the series when he returns.\par 3/15/22: Inj tolerated well.\par 4/5/22: Inj tolerated well, reeval in 6 weeks.\par 5/17/22: HA injections helped but still with significant pain levels - cortisone inj today tolerated well.  He is very frustrated with this situation and that he has had such difficulty with weight loss.\par 9/20/22: Repeat cortisone inj today tolreated well, will reeval in 1 month for possible HA injections again.\par 10/18/22: Cortisone only 1 week relief - request auth for orthovisc as this helped him earlier this year.  Not a surgical candidate and pain limits his ability to return to work.\par 11/15/22: Inj tolerated well.\par 11/22/22: Inj tolerated well.\par 11/29/22: Inj tolerated well.\par 12/6/22: Inj tolerated well.\par 01/03/23: Doing ok since injections.  Cont HEP, weight loss.\par 3/14/23: Worsening pain - cortisone inj today tolerated well.\par 6/16/23: Worsening pain - cortisone inj today and will get auth for orthovisc left knee.\par \par 7/21/23: Orthovisc #1 L knee today, tolerated well

## 2023-07-21 NOTE — IMAGING
[de-identified] : Left knee:\par pain med and lateral joint line \par Crepitus\par NIVI\par Strenth 5/5\par Pulses +2 DP/PT\par Decreased ROM -\par

## 2023-07-21 NOTE — PROCEDURE
[Large Joint Injection] : Large joint injection [Left] : of the left [Knee] : knee [Pain] : pain [Inflammation] : inflammation [X-ray evidence of Osteoarthritis on this or prior visit] : x-ray evidence of Osteoarthritis on this or prior visit [Alcohol] : alcohol [Betadine] : betadine [Ethyl Chloride sprayed topically] : ethyl chloride sprayed topically [Sterile technique used] : sterile technique used [___ cc    1%] : Lidocaine ~Vcc of 1%  [Orthovisc (30mg)] : 30mg of Orthovisc [#1] : series #1 [] : Patient tolerated procedure well [Call if redness, pain or fever occur] : call if redness, pain or fever occur [Apply ice for 15min out of every hour for the next 12-24 hours as tolerated] : apply ice for 15 minutes out of every hour for the next 12-24 hours as tolerated [Patient was advised to rest the joint(s) for ____ days] : patient was advised to rest the joint(s) for [unfilled] days [Morbid obesity] : morbid obesity [Prior failure or difficult injection] : prior failure or difficult injection [All ultrasound images have been permanently captured and stored accordingly in our picture archiving and communication system] : All ultrasound images have been permanently captured and stored accordingly in our picture archiving and communication system [Visualization of the needle and placement of injection was performed without complication] : visualization of the needle and placement of injection was performed without complication

## 2023-07-21 NOTE — HISTORY OF PRESENT ILLNESS
[Dull/Aching] : dull/aching [1] : 1 [Orthovisc] : Orthovisc [de-identified] : 7/21/23: Here for Orthovisc #1 left knee.\par \par Previous doc:\par WC 2/27/13.\par Left knee pain for several years, fell at work sustaining meniscus tear. Prior to this had scope in 2007 with some relief.\par Over the years progressive worsening pain. had success in the past with HA and cortisone injections but this past year\par had less relief.. Cortisone inj 2 weeks ago only helped a few days. Lap band 2003 with 40 lb weight loss. Mult diets in\par the past always with  lb loss but then gains back.\par 4/30/19: Continued pain, started seeing bariatric surgeon and is on meal replacement plan. Down to 334 lbs.\par 5/28/19: Cont pain - lost another 10 lbs since last visit. Seeing bariatrics - may need 2 stage procedure to remove\par band and then get sleeve.\par 6/25/19: Cont pain - had to stop NSAIDs because of upcoming bariatric surgery. Taking Tylenol currently with very little\par relief. Surgery tentatively scheduled 7/24.\par 9/24/19: Cont pain. Had bariatic surgery in July.\par 12/9/19 Orthovisc #2 right knee.\par 12/17/19: Worsening pain - down to 287 lbs.\par 1/14/20: Cont pain, no change in weight.\par 2/11/20: TKA was denied, has hearing in 2 weeks. No change in weight recently.\par 5/13/20: Patient continues to having worsening pain. Has difficulty sleeping. His pain affects his daily life. Reports no\par change in weight since last visit. Would like a refill on his Duexis. Patient is not currently working due to COVID-19. \par 8/11/20: Cont pain, awaiting auth for TKA but deposition was rescheduled. now for 8/31?\par 9/22/20: No change - waiting for TKA auth.\par 11/3/20: Cont pain, TKA was authorized but struggling with weight loss.\par 12/15/20: Worsening left knee pain. Has been unsuccessful with weight loss. Started Ozympic this week.\par 3/16/21: Cont knee pain. Cortisone inj in Dec helped for 1-2 weeks.\par 4/16/21: Orthovisc #1 left knee.\par 4/27/21: Orthovisc #2 left knee.\par 5/4/21: Orthovisc #3 left knee.\par 5/11/21: Orthovisc #4 left knee.\par 6/8/21: Since last injection, he has had dull pain in left knee and does not have any clicking in left knee. Has been able\par to sleep at night and walk smoothly. knee still occ gives out on him \par 7/20 cont to have sig pain but inj helped - all medial pain -- \par 10/19/21: f/u LT knee, symptoms persist. No significant change. HA in the past with relief.\par 12/21/21: No change in symptoms from last visit.\par 1/18/22: Cont pain, no auth yet for HA injections.\par 3/1/22: Cont pain.\par 3/15/22: Orthovisc #1 left knee.\par 4/5/22: Orthovisc #4 left knee, some improvement overall.\par 5/17/22: HA injections helped but still has pain.\par 9/20/22: Had about 4 months relief from CSI  \par 10/18/22: Cortisone gave 1 week relief then pain returned.\par 11/15/22: Orthovisc L KNEE #1\par 11/22/22: Orthovisc #2 left knee.\par 11/29/22: Orthovisc #3 left knee.\par 12/6/22: Orthovisc #4 left knee.\par 01/03/23: HA injections helped but still has pain.\par 3/14/23: Worsening pain recently.\par 6/16/23: Cortisone helped for a couple of weeks. [de-identified] : left knee [de-identified] : Orthovisc

## 2023-07-25 ENCOUNTER — APPOINTMENT (OUTPATIENT)
Dept: ORTHOPEDIC SURGERY | Facility: CLINIC | Age: 63
End: 2023-07-25
Payer: OTHER MISCELLANEOUS

## 2023-07-25 VITALS — HEIGHT: 67 IN | WEIGHT: 315 LBS | BODY MASS INDEX: 49.44 KG/M2

## 2023-07-25 PROCEDURE — 20611 DRAIN/INJ JOINT/BURSA W/US: CPT | Mod: LT

## 2023-07-25 PROCEDURE — 99212 OFFICE O/P EST SF 10 MIN: CPT | Mod: 25

## 2023-07-25 NOTE — IMAGING
[de-identified] : Left knee:\par pain med and lateral joint line \par Crepitus\par NIVI\par Strenth 5/5\par Pulses +2 DP/PT\par Decreased ROM -\par

## 2023-07-25 NOTE — HISTORY OF PRESENT ILLNESS
[Dull/Aching] : dull/aching [2] : 2 [Orthovisc] : Orthovisc [de-identified] : 7/25/23: Orthovisc #2 left knee.\par \par Previous doc:\par WC 2/27/13.\par Left knee pain for several years, fell at work sustaining meniscus tear. Prior to this had scope in 2007 with some relief.\par Over the years progressive worsening pain. had success in the past with HA and cortisone injections but this past year\par had less relief.. Cortisone inj 2 weeks ago only helped a few days. Lap band 2003 with 40 lb weight loss. Mult diets in\par the past always with  lb loss but then gains back.\par 4/30/19: Continued pain, started seeing bariatric surgeon and is on meal replacement plan. Down to 334 lbs.\par 5/28/19: Cont pain - lost another 10 lbs since last visit. Seeing bariatrics - may need 2 stage procedure to remove\par band and then get sleeve.\par 6/25/19: Cont pain - had to stop NSAIDs because of upcoming bariatric surgery. Taking Tylenol currently with very little\par relief. Surgery tentatively scheduled 7/24.\par 9/24/19: Cont pain. Had bariatic surgery in July.\par 12/9/19 Orthovisc #2 right knee.\par 12/17/19: Worsening pain - down to 287 lbs.\par 1/14/20: Cont pain, no change in weight.\par 2/11/20: TKA was denied, has hearing in 2 weeks. No change in weight recently.\par 5/13/20: Patient continues to having worsening pain. Has difficulty sleeping. His pain affects his daily life. Reports no\par change in weight since last visit. Would like a refill on his Duexis. Patient is not currently working due to COVID-19. \par 8/11/20: Cont pain, awaiting auth for TKA but deposition was rescheduled. now for 8/31?\par 9/22/20: No change - waiting for TKA auth.\par 11/3/20: Cont pain, TKA was authorized but struggling with weight loss.\par 12/15/20: Worsening left knee pain. Has been unsuccessful with weight loss. Started Ozympic this week.\par 3/16/21: Cont knee pain. Cortisone inj in Dec helped for 1-2 weeks.\par 4/16/21: Orthovisc #1 left knee.\par 4/27/21: Orthovisc #2 left knee.\par 5/4/21: Orthovisc #3 left knee.\par 5/11/21: Orthovisc #4 left knee.\par 6/8/21: Since last injection, he has had dull pain in left knee and does not have any clicking in left knee. Has been able\par to sleep at night and walk smoothly. knee still occ gives out on him \par 7/20 cont to have sig pain but inj helped - all medial pain -- \par 10/19/21: f/u LT knee, symptoms persist. No significant change. HA in the past with relief.\par 12/21/21: No change in symptoms from last visit.\par 1/18/22: Cont pain, no auth yet for HA injections.\par 3/1/22: Cont pain.\par 3/15/22: Orthovisc #1 left knee.\par 4/5/22: Orthovisc #4 left knee, some improvement overall.\par 5/17/22: HA injections helped but still has pain.\par 9/20/22: Had about 4 months relief from CSI  \par 10/18/22: Cortisone gave 1 week relief then pain returned.\par 11/15/22: Orthovisc L KNEE #1\par 11/22/22: Orthovisc #2 left knee.\par 11/29/22: Orthovisc #3 left knee.\par 12/6/22: Orthovisc #4 left knee.\par 01/03/23: HA injections helped but still has pain.\par 3/14/23: Worsening pain recently.\par 6/16/23: Cortisone helped for a couple of weeks.\par 7/21/23: Here for Orthovisc #1 left knee. [de-identified] : 7/20/23  [de-identified] : left knee  [de-identified] : Orthovisc

## 2023-07-25 NOTE — ASSESSMENT
[FreeTextEntry1] : Previous doc:\par Adv OA left knee. Failed conservative treatments. BMI 53 today (340 lbs) - needs to be < 300 lbs (BMI < 40 would be 250). Had lap band in 2003 and is open to exploring gastric sleeve.\par 4/30/19: Continued pain - working on getting date for bariatrics. For now cont duexis prn and will try tramadol.\par Cortisone inj in the past have only helped for a few days at a time.\par 5/28/19: Doing well with weight loss and planning for removal of gastric band and may need 2 stage for sleeve.\par 6/25/19: Stable for now - planning for bariatrics end of next month - he will be unable to work for a while due to this.\par 9/24/19: Cont pain - working on weight loss (293 lbs today). Injections in the past only a few days relief so will hold on this.\par 12/17/19: Worsening pain. Weight down to 280s now and BMI is < 45 - will get auth for TKA. Has psoriasis but never affecting left knee.\par 1/14/20: Cont difficulty and waiting for left TKA auth.\par 2/11/20: No change - TKA was denied but has appeal hearing in 2 weeks. Cont weight loss.\par 5/13/20: continues to work on weight loss. His Pain and loss of of function continues to progress. He has to take pain medication on a daily basis due how severe his pain is. At this time i believe the benefits outweigh the risks. He will continue on weight loss and our goal of TKA. Refilled his duexis.\par 8/11/20: No change - has deposition scheduled in Aug.\par 9/22/20: No change - currently 330 lbs and discussed need to be < 300 lbs for TKA. Awaiting auth. At one point was 280 lbs but gained some back during pandemic.\par 11/3/20: TKA authorized but unable to proceed as he is struggling with weight loss. BMI too high to proceed with\par surgery and needs to be < 300, ideally 280s to have TKA.\par 12/15/20: Continued pain - No change in weight. Will continue to work on weight loss and continue with home\par exercises. Cortisone injection today.\par 3/16/21: Still with significant pain - weight loss has not progressed and he has been around 345 the past few months -\par no overall loss from bariatrics. Not a TKA candidate at this time due to weight. Will try orthovisc again.\par 4/16/21: Inj tolerated well - asp 5cc.\par 4/27/21: Inj tolerated well.\par 5/3/21: Inj tolerated well.\par 5/11/21: Inj tolerated well.\par 6/8/21 he has slow wt loss down to 337 - dealing with neuropathy and DM -some relief with Inj and he is concerned he may be disabled\par y \par 7/20/21 he is unable to retrun to work due to pain but did have some relief with Knee inj - cont o struggle with wt loss\par 10/19/21: Symptoms unchanged, he has responded to orthovisc and will make another request as he can restart series early-mid November 2021. Weight loss discussed. He will continue OOW.\par 12/21/21: No change in symptoms. Will discuss "granted w prejudice" with WC as clarification is needed of what this means. f/up in 4 weeks. He will continue OOW\par 1/18/22: No change - will start HA injections when aurthorized. Only has granted w prejudice at this time.\par 3/1/22: No auth required to start orthovisc - he is going away next week and would like to start the series when he returns.\par 3/15/22: Inj tolerated well.\par 4/5/22: Inj tolerated well, reeval in 6 weeks.\par 5/17/22: HA injections helped but still with significant pain levels - cortisone inj today tolerated well.  He is very frustrated with this situation and that he has had such difficulty with weight loss.\par 9/20/22: Repeat cortisone inj today tolreated well, will reeval in 1 month for possible HA injections again.\par 10/18/22: Cortisone only 1 week relief - request auth for orthovisc as this helped him earlier this year.  Not a surgical candidate and pain limits his ability to return to work.\par 11/15/22: Inj tolerated well.\par 11/22/22: Inj tolerated well.\par 11/29/22: Inj tolerated well.\par 12/6/22: Inj tolerated well.\par 01/03/23: Doing ok since injections.  Cont HEP, weight loss.\par 3/14/23: Worsening pain - cortisone inj today tolerated well.\par 6/16/23: Worsening pain - cortisone inj today and will get auth for orthovisc left knee.\par 7/21/23: Orthovisc #1 L knee today, tolerated well \par \par 7/25/23: Inj tolerated well.

## 2023-07-25 NOTE — PROCEDURE
[Large Joint Injection] : Large joint injection [Left] : of the left [Knee] : knee [Pain] : pain [Inflammation] : inflammation [X-ray evidence of Osteoarthritis on this or prior visit] : x-ray evidence of Osteoarthritis on this or prior visit [Alcohol] : alcohol [Betadine] : betadine [Ethyl Chloride sprayed topically] : ethyl chloride sprayed topically [Sterile technique used] : sterile technique used [___ cc    1%] : Lidocaine ~Vcc of 1%  [Orthovisc (30mg)] : 30mg of Orthovisc [#2] : series #2 [] : Patient tolerated procedure well [Call if redness, pain or fever occur] : call if redness, pain or fever occur [Apply ice for 15min out of every hour for the next 12-24 hours as tolerated] : apply ice for 15 minutes out of every hour for the next 12-24 hours as tolerated [Patient was advised to rest the joint(s) for ____ days] : patient was advised to rest the joint(s) for [unfilled] days [Previous OTC use and PT nontherapeutic] : patient has tried OTC's including aspirin, Ibuprofen, Aleve, etc or prescription NSAIDS, and/or exercises at home and/or physical therapy without satisfactory response [Patient had decreased mobility in the joint] : patient had decreased mobility in the joint [Risks, benefits, alternatives discussed / Verbal consent obtained] : the risks benefits, and alternatives have been discussed, and verbal consent was obtained [Morbid obesity] : morbid obesity [Prior failure or difficult injection] : prior failure or difficult injection [All ultrasound images have been permanently captured and stored accordingly in our picture archiving and communication system] : All ultrasound images have been permanently captured and stored accordingly in our picture archiving and communication system [Visualization of the needle and placement of injection was performed without complication] : visualization of the needle and placement of injection was performed without complication

## 2023-08-01 ENCOUNTER — APPOINTMENT (OUTPATIENT)
Dept: ORTHOPEDIC SURGERY | Facility: CLINIC | Age: 63
End: 2023-08-01
Payer: OTHER MISCELLANEOUS

## 2023-08-01 VITALS — WEIGHT: 315 LBS | BODY MASS INDEX: 49.44 KG/M2 | HEIGHT: 67 IN

## 2023-08-01 PROCEDURE — 20611 DRAIN/INJ JOINT/BURSA W/US: CPT | Mod: LT

## 2023-08-01 PROCEDURE — 99212 OFFICE O/P EST SF 10 MIN: CPT | Mod: 25

## 2023-08-01 NOTE — ASSESSMENT
[FreeTextEntry1] : Previous doc: Adv OA left knee. Failed conservative treatments. BMI 53 today (340 lbs) - needs to be < 300 lbs (BMI < 40 would be 250). Had lap band in 2003 and is open to exploring gastric sleeve. 4/30/19: Continued pain - working on getting date for bariatrics. For now cont duexis prn and will try tramadol. Cortisone inj in the past have only helped for a few days at a time. 5/28/19: Doing well with weight loss and planning for removal of gastric band and may need 2 stage for sleeve. 6/25/19: Stable for now - planning for bariatrics end of next month - he will be unable to work for a while due to this. 9/24/19: Cont pain - working on weight loss (293 lbs today). Injections in the past only a few days relief so will hold on this. 12/17/19: Worsening pain. Weight down to 280s now and BMI is < 45 - will get auth for TKA. Has psoriasis but never affecting left knee. 1/14/20: Cont difficulty and waiting for left TKA auth. 2/11/20: No change - TKA was denied but has appeal hearing in 2 weeks. Cont weight loss. 5/13/20: continues to work on weight loss. His Pain and loss of of function continues to progress. He has to take pain medication on a daily basis due how severe his pain is. At this time i believe the benefits outweigh the risks. He will continue on weight loss and our goal of TKA. Refilled his duexis. 8/11/20: No change - has deposition scheduled in Aug. 9/22/20: No change - currently 330 lbs and discussed need to be < 300 lbs for TKA. Awaiting auth. At one point was 280 lbs but gained some back during pandemic. 11/3/20: TKA authorized but unable to proceed as he is struggling with weight loss. BMI too high to proceed with surgery and needs to be < 300, ideally 280s to have TKA. 12/15/20: Continued pain - No change in weight. Will continue to work on weight loss and continue with home exercises. Cortisone injection today. 3/16/21: Still with significant pain - weight loss has not progressed and he has been around 345 the past few months - no overall loss from bariatrics. Not a TKA candidate at this time due to weight. Will try orthovisc again. 4/16/21: Inj tolerated well - asp 5cc. 4/27/21: Inj tolerated well. 5/3/21: Inj tolerated well. 5/11/21: Inj tolerated well. 6/8/21 he has slow wt loss down to 337 - dealing with neuropathy and DM -some relief with Inj and he is concerned he may be disabled y  7/20/21 he is unable to retrun to work due to pain but did have some relief with Knee inj - cont o struggle with wt loss 10/19/21: Symptoms unchanged, he has responded to orthovisc and will make another request as he can restart series early-mid November 2021. Weight loss discussed. He will continue OOW. 12/21/21: No change in symptoms. Will discuss "granted w prejudice" with WC as clarification is needed of what this means. f/up in 4 weeks. He will continue OOW 1/18/22: No change - will start HA injections when aurthorized. Only has granted w prejudice at this time. 3/1/22: No auth required to start orthovisc - he is going away next week and would like to start the series when he returns. 3/15/22: Inj tolerated well. 4/5/22: Inj tolerated well, reeval in 6 weeks. 5/17/22: HA injections helped but still with significant pain levels - cortisone inj today tolerated well.  He is very frustrated with this situation and that he has had such difficulty with weight loss. 9/20/22: Repeat cortisone inj today tolreated well, will reeval in 1 month for possible HA injections again. 10/18/22: Cortisone only 1 week relief - request auth for orthovisc as this helped him earlier this year.  Not a surgical candidate and pain limits his ability to return to work. 11/15/22: Inj tolerated well. 11/22/22: Inj tolerated well. 11/29/22: Inj tolerated well. 12/6/22: Inj tolerated well. 01/03/23: Doing ok since injections.  Cont HEP, weight loss. 3/14/23: Worsening pain - cortisone inj today tolerated well. 6/16/23: Worsening pain - cortisone inj today and will get auth for orthovisc left knee. 7/21/23: Orthovisc #1 L knee today, tolerated well  7/25/23: Inj tolerated well.  8/1/23: Inj tolerated well.

## 2023-08-01 NOTE — IMAGING
[de-identified] : Left knee:\par  pain med and lateral joint line \par  Crepitus\par  NIVI\par  Strenth 5/5\par  Pulses +2 DP/PT\par  Decreased ROM -\par

## 2023-08-01 NOTE — PROCEDURE
[Large Joint Injection] : Large joint injection [Left] : of the left [Knee] : knee [Pain] : pain [Inflammation] : inflammation [X-ray evidence of Osteoarthritis on this or prior visit] : x-ray evidence of Osteoarthritis on this or prior visit [Alcohol] : alcohol [Betadine] : betadine [Ethyl Chloride sprayed topically] : ethyl chloride sprayed topically [Sterile technique used] : sterile technique used [___ cc    1%] : Lidocaine ~Vcc of 1%  [Orthovisc (30mg)] : 30mg of Orthovisc [] : Patient tolerated procedure well [Call if redness, pain or fever occur] : call if redness, pain or fever occur [Apply ice for 15min out of every hour for the next 12-24 hours as tolerated] : apply ice for 15 minutes out of every hour for the next 12-24 hours as tolerated [Patient was advised to rest the joint(s) for ____ days] : patient was advised to rest the joint(s) for [unfilled] days [Previous OTC use and PT nontherapeutic] : patient has tried OTC's including aspirin, Ibuprofen, Aleve, etc or prescription NSAIDS, and/or exercises at home and/or physical therapy without satisfactory response [Patient had decreased mobility in the joint] : patient had decreased mobility in the joint [Risks, benefits, alternatives discussed / Verbal consent obtained] : the risks benefits, and alternatives have been discussed, and verbal consent was obtained [Morbid obesity] : morbid obesity [Prior failure or difficult injection] : prior failure or difficult injection [All ultrasound images have been permanently captured and stored accordingly in our picture archiving and communication system] : All ultrasound images have been permanently captured and stored accordingly in our picture archiving and communication system [Visualization of the needle and placement of injection was performed without complication] : visualization of the needle and placement of injection was performed without complication [#3] : series #3

## 2023-08-01 NOTE — HISTORY OF PRESENT ILLNESS
[Dull/Aching] : dull/aching [3] : 3 [Orthovisc] : Orthovisc [de-identified] : 8//23: Orthovisc #3 left knee.  Previous doc: WC 2/27/13. Left knee pain for several years, fell at work sustaining meniscus tear. Prior to this had scope in 2007 with some relief. Over the years progressive worsening pain. had success in the past with HA and cortisone injections but this past year had less relief.. Cortisone inj 2 weeks ago only helped a few days. Lap band 2003 with 40 lb weight loss. Mult diets in the past always with  lb loss but then gains back. 4/30/19: Continued pain, started seeing bariatric surgeon and is on meal replacement plan. Down to 334 lbs. 5/28/19: Cont pain - lost another 10 lbs since last visit. Seeing bariatrics - may need 2 stage procedure to remove band and then get sleeve. 6/25/19: Cont pain - had to stop NSAIDs because of upcoming bariatric surgery. Taking Tylenol currently with very little relief. Surgery tentatively scheduled 7/24. 9/24/19: Cont pain. Had bariatic surgery in July. 12/9/19 Orthovisc #2 right knee. 12/17/19: Worsening pain - down to 287 lbs. 1/14/20: Cont pain, no change in weight. 2/11/20: TKA was denied, has hearing in 2 weeks. No change in weight recently. 5/13/20: Patient continues to having worsening pain. Has difficulty sleeping. His pain affects his daily life. Reports no change in weight since last visit. Would like a refill on his Duexis. Patient is not currently working due to COVID-[employer].  8/11/20: Cont pain, awaiting auth for TKA but deposition was rescheduled. now for 8/31? 9/22/20: No change - waiting for TKA auth. 11/3/20: Cont pain, TKA was authorized but struggling with weight loss. 12/15/20: Worsening left knee pain. Has been unsuccessful with weight loss. Started Ozympic this week. 3/16/21: Cont knee pain. Cortisone inj in Dec helped for 1-2 weeks. 4/16/21: Orthovisc #1 left knee. 4/27/21: Orthovisc #2 left knee. 5/4/21: Orthovisc #3 left knee. 5/11/21: Orthovisc #4 left knee. 6/8/21: Since last injection, he has had dull pain in left knee and does not have any clicking in left knee. Has been able to sleep at night and walk smoothly. knee still occ gives out on him  7/20 cont to have sig pain but inj helped - all medial pain --  10/19/21: f/u LT knee, symptoms persist. No significant change. HA in the past with relief. 12/21/21: No change in symptoms from last visit. 1/18/22: Cont pain, no auth yet for HA injections. 3/1/22: Cont pain. 3/15/22: Orthovisc #1 left knee. 4/5/22: Orthovisc #4 left knee, some improvement overall. 5/17/22: HA injections helped but still has pain. 9/20/22: Had about 4 months relief from CSI   10/18/22: Cortisone gave 1 week relief then pain returned. 11/15/22: Orthovisc L KNEE #1 11/22/22: Orthovisc #2 left knee. 11/29/22: Orthovisc #3 left knee. 12/6/22: Orthovisc #4 left knee. 01/03/23: HA injections helped but still has pain. 3/14/23: Worsening pain recently. 6/16/23: Cortisone helped for a couple of weeks. 7/21/23: Here for Orthovisc #1 left knee. 7/25/23: Orthovisc #2 left knee. [de-identified] : left knee  [de-identified] : orthovisc

## 2023-08-08 ENCOUNTER — APPOINTMENT (OUTPATIENT)
Dept: ORTHOPEDIC SURGERY | Facility: CLINIC | Age: 63
End: 2023-08-08
Payer: OTHER MISCELLANEOUS

## 2023-08-08 PROCEDURE — 99212 OFFICE O/P EST SF 10 MIN: CPT | Mod: 25

## 2023-08-08 PROCEDURE — 20611 DRAIN/INJ JOINT/BURSA W/US: CPT | Mod: LT

## 2023-08-08 NOTE — IMAGING
[de-identified] : Left knee:\par  pain med and lateral joint line \par  Crepitus\par  NIVI\par  Strenth 5/5\par  Pulses +2 DP/PT\par  Decreased ROM -\par

## 2023-08-08 NOTE — ASSESSMENT
[FreeTextEntry1] : Previous doc: Adv OA left knee. Failed conservative treatments. BMI 53 today (340 lbs) - needs to be < 300 lbs (BMI < 40 would be 250). Had lap band in 2003 and is open to exploring gastric sleeve. 4/30/19: Continued pain - working on getting date for bariatrics. For now cont duexis prn and will try tramadol. Cortisone inj in the past have only helped for a few days at a time. 5/28/19: Doing well with weight loss and planning for removal of gastric band and may need 2 stage for sleeve. 6/25/19: Stable for now - planning for bariatrics end of next month - he will be unable to work for a while due to this. 9/24/19: Cont pain - working on weight loss (293 lbs today). Injections in the past only a few days relief so will hold on this. 12/17/19: Worsening pain. Weight down to 280s now and BMI is < 45 - will get auth for TKA. Has psoriasis but never affecting left knee. 1/14/20: Cont difficulty and waiting for left TKA auth. 2/11/20: No change - TKA was denied but has appeal hearing in 2 weeks. Cont weight loss. 5/13/20: continues to work on weight loss. His Pain and loss of of function continues to progress. He has to take pain medication on a daily basis due how severe his pain is. At this time i believe the benefits outweigh the risks. He will continue on weight loss and our goal of TKA. Refilled his duexis. 8/11/20: No change - has deposition scheduled in Aug. 9/22/20: No change - currently 330 lbs and discussed need to be < 300 lbs for TKA. Awaiting auth. At one point was 280 lbs but gained some back during pandemic. 11/3/20: TKA authorized but unable to proceed as he is struggling with weight loss. BMI too high to proceed with surgery and needs to be < 300, ideally 280s to have TKA. 12/15/20: Continued pain - No change in weight. Will continue to work on weight loss and continue with home exercises. Cortisone injection today. 3/16/21: Still with significant pain - weight loss has not progressed and he has been around 345 the past few months - no overall loss from bariatrics. Not a TKA candidate at this time due to weight. Will try orthovisc again. 4/16/21: Inj tolerated well - asp 5cc. 4/27/21: Inj tolerated well. 5/3/21: Inj tolerated well. 5/11/21: Inj tolerated well. 6/8/21 he has slow wt loss down to 337 - dealing with neuropathy and DM -some relief with Inj and he is concerned he may be disabled y  7/20/21 he is unable to retrun to work due to pain but did have some relief with Knee inj - cont o struggle with wt loss 10/19/21: Symptoms unchanged, he has responded to orthovisc and will make another request as he can restart series early-mid November 2021. Weight loss discussed. He will continue OOW. 12/21/21: No change in symptoms. Will discuss "granted w prejudice" with WC as clarification is needed of what this means. f/up in 4 weeks. He will continue OOW 1/18/22: No change - will start HA injections when aurthorized. Only has granted w prejudice at this time. 3/1/22: No auth required to start orthovisc - he is going away next week and would like to start the series when he returns. 3/15/22: Inj tolerated well. 4/5/22: Inj tolerated well, reeval in 6 weeks. 5/17/22: HA injections helped but still with significant pain levels - cortisone inj today tolerated well.  He is very frustrated with this situation and that he has had such difficulty with weight loss. 9/20/22: Repeat cortisone inj today tolreated well, will reeval in 1 month for possible HA injections again. 10/18/22: Cortisone only 1 week relief - request auth for orthovisc as this helped him earlier this year.  Not a surgical candidate and pain limits his ability to return to work. 11/15/22: Inj tolerated well. 11/22/22: Inj tolerated well. 11/29/22: Inj tolerated well. 12/6/22: Inj tolerated well. 01/03/23: Doing ok since injections.  Cont HEP, weight loss. 3/14/23: Worsening pain - cortisone inj today tolerated well. 6/16/23: Worsening pain - cortisone inj today and will get auth for orthovisc left knee. 7/21/23: Orthovisc #1 L knee today, tolerated well  7/25/23: Inj tolerated well. 8/1/23: Inj tolerated well.  8/8/23: Inj tolerated well.

## 2023-08-08 NOTE — PROCEDURE
[Large Joint Injection] : Large joint injection [Left] : of the left [Knee] : knee [Pain] : pain [Inflammation] : inflammation [X-ray evidence of Osteoarthritis on this or prior visit] : x-ray evidence of Osteoarthritis on this or prior visit [Alcohol] : alcohol [Betadine] : betadine [Ethyl Chloride sprayed topically] : ethyl chloride sprayed topically [Sterile technique used] : sterile technique used [___ cc    1%] : Lidocaine ~Vcc of 1%  [Orthovisc (30mg)] : 30mg of Orthovisc [] : Patient tolerated procedure well [Call if redness, pain or fever occur] : call if redness, pain or fever occur [Apply ice for 15min out of every hour for the next 12-24 hours as tolerated] : apply ice for 15 minutes out of every hour for the next 12-24 hours as tolerated [Patient was advised to rest the joint(s) for ____ days] : patient was advised to rest the joint(s) for [unfilled] days [Previous OTC use and PT nontherapeutic] : patient has tried OTC's including aspirin, Ibuprofen, Aleve, etc or prescription NSAIDS, and/or exercises at home and/or physical therapy without satisfactory response [Patient had decreased mobility in the joint] : patient had decreased mobility in the joint [Risks, benefits, alternatives discussed / Verbal consent obtained] : the risks benefits, and alternatives have been discussed, and verbal consent was obtained [Morbid obesity] : morbid obesity [Prior failure or difficult injection] : prior failure or difficult injection [All ultrasound images have been permanently captured and stored accordingly in our picture archiving and communication system] : All ultrasound images have been permanently captured and stored accordingly in our picture archiving and communication system [Visualization of the needle and placement of injection was performed without complication] : visualization of the needle and placement of injection was performed without complication [#4] : series #4

## 2023-08-08 NOTE — HISTORY OF PRESENT ILLNESS
[Dull/Aching] : dull/aching [3] : 3 [Orthovisc] : Orthovisc [de-identified] : 8/8/23: Orthovisc #4 left knee.  Previous doc: WC 2/27/13. Left knee pain for several years, fell at work sustaining meniscus tear. Prior to this had scope in 2007 with some relief. Over the years progressive worsening pain. had success in the past with HA and cortisone injections but this past year had less relief.. Cortisone inj 2 weeks ago only helped a few days. Lap band 2003 with 40 lb weight loss. Mult diets in the past always with  lb loss but then gains back. 4/30/19: Continued pain, started seeing bariatric surgeon and is on meal replacement plan. Down to 334 lbs. 5/28/19: Cont pain - lost another 10 lbs since last visit. Seeing bariatrics - may need 2 stage procedure to remove band and then get sleeve. 6/25/19: Cont pain - had to stop NSAIDs because of upcoming bariatric surgery. Taking Tylenol currently with very little relief. Surgery tentatively scheduled 7/24. 9/24/19: Cont pain. Had bariatic surgery in July. 12/9/19 Orthovisc #2 right knee. 12/17/19: Worsening pain - down to 287 lbs. 1/14/20: Cont pain, no change in weight. 2/11/20: TKA was denied, has hearing in 2 weeks. No change in weight recently. 5/13/20: Patient continues to having worsening pain. Has difficulty sleeping. His pain affects his daily life. Reports no change in weight since last visit. Would like a refill on his Duexis. Patient is not currently working due to COVID[employer].  8/11/20: Cont pain, awaiting auth for TKA but deposition was rescheduled. now for 8/31? 9/22/20: No change - waiting for TKA auth. 11/3/20: Cont pain, TKA was authorized but struggling with weight loss. 12/15/20: Worsening left knee pain. Has been unsuccessful with weight loss. Started Ozympic this week. 3/16/21: Cont knee pain. Cortisone inj in Dec helped for 1-2 weeks. 4/16/21: Orthovisc #1 left knee. 4/27/21: Orthovisc #2 left knee. 5/4/21: Orthovisc #3 left knee. 5/11/21: Orthovisc #4 left knee. 6/8/21: Since last injection, he has had dull pain in left knee and does not have any clicking in left knee. Has been able to sleep at night and walk smoothly. knee still occ gives out on him  7/20 cont to have sig pain but inj helped - all medial pain --  10/19/21: f/u LT knee, symptoms persist. No significant change. HA in the past with relief. 12/21/21: No change in symptoms from last visit. 1/18/22: Cont pain, no auth yet for HA injections. 3/1/22: Cont pain. 3/15/22: Orthovisc #1 left knee. 4/5/22: Orthovisc #4 left knee, some improvement overall. 5/17/22: HA injections helped but still has pain. 9/20/22: Had about 4 months relief from CSI   10/18/22: Cortisone gave 1 week relief then pain returned. 11/15/22: Orthovisc L KNEE #1 11/22/22: Orthovisc #2 left knee. 11/29/22: Orthovisc #3 left knee. 12/6/22: Orthovisc #4 left knee. 01/03/23: HA injections helped but still has pain. 3/14/23: Worsening pain recently. 6/16/23: Cortisone helped for a couple of weeks. 7/21/23: Here for Orthovisc #1 left knee. 7/25/23: Orthovisc #2 left knee. 8//23: Orthovisc #3 left knee. [FreeTextEntry5] : Darwin is a 62 year M, here for INJ#4.  [de-identified] : left knee  [de-identified] : orthovisc

## 2023-08-16 ENCOUNTER — APPOINTMENT (OUTPATIENT)
Dept: ORTHOPEDIC SURGERY | Facility: CLINIC | Age: 63
End: 2023-08-16
Payer: OTHER MISCELLANEOUS

## 2023-08-16 VITALS — WEIGHT: 315 LBS | HEIGHT: 67 IN | BODY MASS INDEX: 49.44 KG/M2

## 2023-08-16 PROCEDURE — 99214 OFFICE O/P EST MOD 30 MIN: CPT

## 2023-08-16 NOTE — HISTORY OF PRESENT ILLNESS
[Work related] : work related [Gradual] : gradual [5] : 5 [3] : 3 [Dull/Aching] : dull/aching [Constant] : constant [Rest] : rest [Standing] : standing [Not working due to injury] : Work status: not working due to injury [de-identified] : Patient states he is feeing somewhat better after visco, pain continues with walking, stairs, getting up from a seated position.  [] : Post Surgical Visit: no [FreeTextEntry1] : right knee [FreeTextEntry3] : 01/13/14 [de-identified] : lifting [de-identified] : At home exercises.

## 2023-08-16 NOTE — DISCUSSION/SUMMARY
[Medication Risks Reviewed] : Medication risks reviewed [de-identified] : rest, ice, activity modification. Frequency of visco and csi discussed.  Return in 2 months for eval.

## 2023-08-29 ENCOUNTER — APPOINTMENT (OUTPATIENT)
Dept: ORTHOPEDIC SURGERY | Facility: CLINIC | Age: 63
End: 2023-08-29
Payer: OTHER MISCELLANEOUS

## 2023-08-29 PROCEDURE — J3490M: CUSTOM

## 2023-08-29 PROCEDURE — 99214 OFFICE O/P EST MOD 30 MIN: CPT | Mod: 25

## 2023-08-29 PROCEDURE — 20611 DRAIN/INJ JOINT/BURSA W/US: CPT | Mod: LT

## 2023-08-29 NOTE — DISCUSSION/SUMMARY
[de-identified] : The patient was advised of the diagnosis.  The natural history of the pathology was explained in full to the patient in layman's terms. All questions were answered.  The risks and benefits of surgical and non-surgical treatment alternatives were explained in full to the patient.

## 2023-08-29 NOTE — HISTORY OF PRESENT ILLNESS
[Work related] : work related [Dull/Aching] : dull/aching [de-identified] : 8/29/23: HA helped but still a lot of pain.  Previous doc: WC 2/27/13. Left knee pain for several years, fell at work sustaining meniscus tear. Prior to this had scope in 2007 with some relief. Over the years progressive worsening pain. had success in the past with HA and cortisone injections but this past year had less relief.. Cortisone inj 2 weeks ago only helped a few days. Lap band 2003 with 40 lb weight loss. Mult diets in the past always with  lb loss but then gains back. 4/30/19: Continued pain, started seeing bariatric surgeon and is on meal replacement plan. Down to 334 lbs. 5/28/19: Cont pain - lost another 10 lbs since last visit. Seeing bariatrics - may need 2 stage procedure to remove band and then get sleeve. 6/25/19: Cont pain - had to stop NSAIDs because of upcoming bariatric surgery. Taking Tylenol currently with very little relief. Surgery tentatively scheduled 7/24. 9/24/19: Cont pain. Had bariatic surgery in July. 12/9/19 Orthovisc #2 right knee. 12/17/19: Worsening pain - down to 287 lbs. 1/14/20: Cont pain, no change in weight. 2/11/20: TKA was denied, has hearing in 2 weeks. No change in weight recently. 5/13/20: Patient continues to having worsening pain. Has difficulty sleeping. His pain affects his daily life. Reports no change in weight since last visit. Would like a refill on his Duexis. Patient is not currently working due to COVID-19.  8/11/20: Cont pain, awaiting auth for TKA but deposition was rescheduled. now for 8/31? 9/22/20: No change - waiting for TKA auth. 11/3/20: Cont pain, TKA was authorized but struggling with weight loss. 12/15/20: Worsening left knee pain. Has been unsuccessful with weight loss. Started Ozympic this week. 3/16/21: Cont knee pain. Cortisone inj in Dec helped for 1-2 weeks. 4/16/21: Orthovisc #1 left knee. 4/27/21: Orthovisc #2 left knee. 5/4/21: Orthovisc #3 left knee. 5/11/21: Orthovisc #4 left knee. 6/8/21: Since last injection, he has had dull pain in left knee and does not have any clicking in left knee. Has been able to sleep at night and walk smoothly. knee still occ gives out on him  7/20 cont to have sig pain but inj helped - all medial pain --  10/19/21: f/u LT knee, symptoms persist. No significant change. HA in the past with relief. 12/21/21: No change in symptoms from last visit. 1/18/22: Cont pain, no auth yet for HA injections. 3/1/22: Cont pain. 3/15/22: Orthovisc #1 left knee. 4/5/22: Orthovisc #4 left knee, some improvement overall. 5/17/22: HA injections helped but still has pain. 9/20/22: Had about 4 months relief from CSI   10/18/22: Cortisone gave 1 week relief then pain returned. 11/15/22: Orthovisc L KNEE #1 11/22/22: Orthovisc #2 left knee. 11/29/22: Orthovisc #3 left knee. 12/6/22: Orthovisc #4 left knee. 01/03/23: HA injections helped but still has pain. 3/14/23: Worsening pain recently. 6/16/23: Cortisone helped for a couple of weeks. 7/21/23: Here for Orthovisc #1 left knee. 7/25/23: Orthovisc #2 left knee. 8//23: Orthovisc #3 left knee. 8/8/23: Orthovisc #4 left knee.

## 2023-08-29 NOTE — IMAGING
[de-identified] : Left knee:\par  pain med and lateral joint line \par  Crepitus\par  NIVI\par  Strenth 5/5\par  Pulses +2 DP/PT\par  Decreased ROM -\par

## 2023-08-29 NOTE — PROCEDURE
[FreeTextEntry3] : Large joint injection was performed on the left knee. The indication for this procedure was pain, inflammation, and x-ray evidence of Osteoarthritis on this or prior visit. The site was prepped with betadine, ethyl chloride sprayed topically, and sterile technique used. An injection of Lidocaine 3cc of 1% , Bupivacaine (Marcaine) 5cc of 0.25% , Methylprednisolone (Depomedrol) cc of 80 mg was used. Patient was advised to call if redness, pain, or fever occur, apply ice for 15 minutes out of every hour for the next 12-24 hours as tolerated and patient was advised to rest the joint(s) for days. Patient has tried OTC's including aspirin, Ibuprofen, Aleve, etc or prescription NSAIDS, and/or exercises at home and/or physical therapy without satisfactory response and patient had decreased mobility in the joint. Ultrasound guidance was indicated for this patient due to better visualize joint space. All ultrasound images have been permanently captured and stored accordingly in our picture.

## 2023-08-29 NOTE — ASSESSMENT
[FreeTextEntry1] : Previous doc: Adv OA left knee. Failed conservative treatments. BMI 53 today (340 lbs) - needs to be < 300 lbs (BMI < 40 would be 250). Had lap band in 2003 and is open to exploring gastric sleeve. 4/30/19: Continued pain - working on getting date for bariatrics. For now cont duexis prn and will try tramadol. Cortisone inj in the past have only helped for a few days at a time. 5/28/19: Doing well with weight loss and planning for removal of gastric band and may need 2 stage for sleeve. 6/25/19: Stable for now - planning for bariatrics end of next month - he will be unable to work for a while due to this. 9/24/19: Cont pain - working on weight loss (293 lbs today). Injections in the past only a few days relief so will hold on this. 12/17/19: Worsening pain. Weight down to 280s now and BMI is < 45 - will get auth for TKA. Has psoriasis but never affecting left knee. 1/14/20: Cont difficulty and waiting for left TKA auth. 2/11/20: No change - TKA was denied but has appeal hearing in 2 weeks. Cont weight loss. 5/13/20: continues to work on weight loss. His Pain and loss of of function continues to progress. He has to take pain medication on a daily basis due how severe his pain is. At this time i believe the benefits outweigh the risks. He will continue on weight loss and our goal of TKA. Refilled his duexis. 8/11/20: No change - has deposition scheduled in Aug. 9/22/20: No change - currently 330 lbs and discussed need to be < 300 lbs for TKA. Awaiting auth. At one point was 280 lbs but gained some back during pandemic. 11/3/20: TKA authorized but unable to proceed as he is struggling with weight loss. BMI too high to proceed with surgery and needs to be < 300, ideally 280s to have TKA. 12/15/20: Continued pain - No change in weight. Will continue to work on weight loss and continue with home exercises. Cortisone injection today. 3/16/21: Still with significant pain - weight loss has not progressed and he has been around 345 the past few months - no overall loss from bariatrics. Not a TKA candidate at this time due to weight. Will try orthovisc again. 4/16/21: Inj tolerated well - asp 5cc. 4/27/21: Inj tolerated well. 5/3/21: Inj tolerated well. 5/11/21: Inj tolerated well. 6/8/21 he has slow wt loss down to 337 - dealing with neuropathy and DM -some relief with Inj and he is concerned he may be disabled y  7/20/21 he is unable to retrun to work due to pain but did have some relief with Knee inj - cont o struggle with wt loss 10/19/21: Symptoms unchanged, he has responded to orthovisc and will make another request as he can restart series early-mid November 2021. Weight loss discussed. He will continue OOW. 12/21/21: No change in symptoms. Will discuss "granted w prejudice" with WC as clarification is needed of what this means. f/up in 4 weeks. He will continue OOW 1/18/22: No change - will start HA injections when aurthorized. Only has granted w prejudice at this time. 3/1/22: No auth required to start orthovisc - he is going away next week and would like to start the series when he returns. 3/15/22: Inj tolerated well. 4/5/22: Inj tolerated well, reeval in 6 weeks. 5/17/22: HA injections helped but still with significant pain levels - cortisone inj today tolerated well.  He is very frustrated with this situation and that he has had such difficulty with weight loss. 9/20/22: Repeat cortisone inj today tolreated well, will reeval in 1 month for possible HA injections again. 10/18/22: Cortisone only 1 week relief - request auth for orthovisc as this helped him earlier this year.  Not a surgical candidate and pain limits his ability to return to work. 11/15/22: Inj tolerated well. 11/22/22: Inj tolerated well. 11/29/22: Inj tolerated well. 12/6/22: Inj tolerated well. 01/03/23: Doing ok since injections.  Cont HEP, weight loss. 3/14/23: Worsening pain - cortisone inj today tolerated well. 6/16/23: Worsening pain - cortisone inj today and will get auth for orthovisc left knee. 7/21/23: Orthovisc #1 L knee today, tolerated well  7/25/23: Inj tolerated well. 8/1/23: Inj tolerated well. 8/8/23: Inj tolerated well.  8/29/23: Cortisone inj today.  HA helped a little.

## 2023-10-18 ENCOUNTER — APPOINTMENT (OUTPATIENT)
Dept: ORTHOPEDIC SURGERY | Facility: CLINIC | Age: 63
End: 2023-10-18
Payer: OTHER MISCELLANEOUS

## 2023-10-18 VITALS — WEIGHT: 315 LBS | BODY MASS INDEX: 49.44 KG/M2 | HEIGHT: 67 IN

## 2023-10-18 PROCEDURE — 99214 OFFICE O/P EST MOD 30 MIN: CPT

## 2023-11-28 ENCOUNTER — APPOINTMENT (OUTPATIENT)
Dept: ORTHOPEDIC SURGERY | Facility: CLINIC | Age: 63
End: 2023-11-28
Payer: OTHER MISCELLANEOUS

## 2023-11-28 VITALS — BODY MASS INDEX: 49.44 KG/M2 | HEIGHT: 67 IN | WEIGHT: 315 LBS

## 2023-11-28 PROCEDURE — 99213 OFFICE O/P EST LOW 20 MIN: CPT | Mod: ACP

## 2023-12-18 ENCOUNTER — APPOINTMENT (OUTPATIENT)
Dept: ORTHOPEDIC SURGERY | Facility: CLINIC | Age: 63
End: 2023-12-18
Payer: OTHER MISCELLANEOUS

## 2023-12-18 PROCEDURE — 99214 OFFICE O/P EST MOD 30 MIN: CPT

## 2023-12-18 RX ORDER — IBUPROFEN 800 MG/1
800 TABLET ORAL 3 TIMES DAILY
Qty: 90 | Refills: 1 | Status: ACTIVE | COMMUNITY
Start: 2022-09-08 | End: 1900-01-01

## 2023-12-18 NOTE — PHYSICAL EXAM
[Right] : right knee [5___] : hamstring 5[unfilled]/5 [] : no medial joint line tenderness [TWNoteComboBox7] : flexion 100 degrees [de-identified] : extension 0 degrees

## 2023-12-18 NOTE — DISCUSSION/SUMMARY
[de-identified] : Will repeat Visco in February.  IBU renewed. Return in 2 months for re-eval.   Let this serve as a formal request for authorization ORthovisc right knee Detail Level: Zone Plan: Patient defers treatment at this time. Initiate Treatment: Discussed moisturizers with urea like Eucerin or uradin daily Initiate Treatment: Can use Eucerin with urea or uradin

## 2023-12-18 NOTE — HISTORY OF PRESENT ILLNESS
[5] : 5 [3] : 3 [de-identified] : Patient has persistent symptoms in the right knee, completed Orthovisc 6/21/23, takes IBU as needed for pain. Trying to manage.  [FreeTextEntry1] : right knee

## 2024-02-27 ENCOUNTER — APPOINTMENT (OUTPATIENT)
Dept: ORTHOPEDIC SURGERY | Facility: CLINIC | Age: 64
End: 2024-02-27
Payer: OTHER MISCELLANEOUS

## 2024-02-27 DIAGNOSIS — E66.01 MORBID (SEVERE) OBESITY DUE TO EXCESS CALORIES: ICD-10-CM

## 2024-02-27 PROCEDURE — 99214 OFFICE O/P EST MOD 30 MIN: CPT

## 2024-02-27 NOTE — HISTORY OF PRESENT ILLNESS
[8] : 8 [Work related] : work related [Dull/Aching] : dull/aching [5] : 5 [de-identified] : 2/27/24: Cont pain and occasional locking in left knee.  Previous doc: WC 2/27/13. Left knee pain for several years, fell at work sustaining meniscus tear. Prior to this had scope in 2007 with some relief. Over the years progressive worsening pain. had success in the past with HA and cortisone injections but this past year had less relief.. Cortisone inj 2 weeks ago only helped a few days. Lap band 2003 with 40 lb weight loss. Mult diets in the past always with  lb loss but then gains back. 4/30/19: Continued pain, started seeing bariatric surgeon and is on meal replacement plan. Down to 334 lbs. 5/28/19: Cont pain - lost another 10 lbs since last visit. Seeing bariatrics - may need 2 stage procedure to remove band and then get sleeve. 6/25/19: Cont pain - had to stop NSAIDs because of upcoming bariatric surgery. Taking Tylenol currently with very little relief. Surgery tentatively scheduled 7/24. 9/24/19: Cont pain. Had bariatic surgery in July. 12/9/19 Orthovisc #2 right knee. 12/17/19: Worsening pain - down to 287 lbs. 1/14/20: Cont pain, no change in weight. 2/11/20: TKA was denied, has hearing in 2 weeks. No change in weight recently. 5/13/20: Patient continues to having worsening pain. Has difficulty sleeping. His pain affects his daily life. Reports no change in weight since last visit. Would like a refill on his Duexis. Patient is not currently working due to COVID-19.  8/11/20: Cont pain, awaiting auth for TKA but deposition was rescheduled. now for 8/31? 9/22/20: No change - waiting for TKA auth. 11/3/20: Cont pain, TKA was authorized but struggling with weight loss. 12/15/20: Worsening left knee pain. Has been unsuccessful with weight loss. Started Ozympic this week. 3/16/21: Cont knee pain. Cortisone inj in Dec helped for 1-2 weeks. 4/16/21: Orthovisc #1 left knee. 4/27/21: Orthovisc #2 left knee. 5/4/21: Orthovisc #3 left knee. 5/11/21: Orthovisc #4 left knee. 6/8/21: Since last injection, he has had dull pain in left knee and does not have any clicking in left knee. Has been able to sleep at night and walk smoothly. knee still occ gives out on him  7/20 cont to have sig pain but inj helped - all medial pain --  10/19/21: f/u LT knee, symptoms persist. No significant change. HA in the past with relief. 12/21/21: No change in symptoms from last visit. 1/18/22: Cont pain, no auth yet for HA injections. 3/1/22: Cont pain. 3/15/22: Orthovisc #1 left knee. 4/5/22: Orthovisc #4 left knee, some improvement overall. 5/17/22: HA injections helped but still has pain. 9/20/22: Had about 4 months relief from CSI   10/18/22: Cortisone gave 1 week relief then pain returned. 11/15/22: Orthovisc L KNEE #1 11/22/22: Orthovisc #2 left knee. 11/29/22: Orthovisc #3 left knee. 12/6/22: Orthovisc #4 left knee. 01/03/23: HA injections helped but still has pain. 3/14/23: Worsening pain recently. 6/16/23: Cortisone helped for a couple of weeks. 7/21/23: Here for Orthovisc #1 left knee. 7/25/23: Orthovisc #2 left knee. 8//23: Orthovisc #3 left knee. 8/8/23: Orthovisc #4 left knee. 8/29/23: HA helped but still a lot of pain. 11/28/23: Cortisone inj helped for about a month then pain returned. [] : no [FreeTextEntry5] : Felt slight relief from orthovisc.

## 2024-02-27 NOTE — DISCUSSION/SUMMARY
[de-identified] : The patient was advised of the diagnosis.  The natural history of the pathology was explained in full to the patient in layman's terms. All questions were answered.  The risks and benefits of surgical and non-surgical treatment alternatives were explained in full to the patient.

## 2024-02-27 NOTE — IMAGING
[de-identified] : Left knee:\par  pain med and lateral joint line \par  Crepitus\par  NIVI\par  Strenth 5/5\par  Pulses +2 DP/PT\par  Decreased ROM -\par

## 2024-02-27 NOTE — ASSESSMENT
[FreeTextEntry1] : Previous doc: Adv OA left knee. Failed conservative treatments. BMI 53 today (340 lbs) - needs to be < 300 lbs (BMI < 40 would be 250). Had lap band in 2003 and is open to exploring gastric sleeve. 4/30/19: Continued pain - working on getting date for bariatrics. For now cont duexis prn and will try tramadol. Cortisone inj in the past have only helped for a few days at a time. 5/28/19: Doing well with weight loss and planning for removal of gastric band and may need 2 stage for sleeve. 6/25/19: Stable for now - planning for bariatrics end of next month - he will be unable to work for a while due to this. 9/24/19: Cont pain - working on weight loss (293 lbs today). Injections in the past only a few days relief so will hold on this. 12/17/19: Worsening pain. Weight down to 280s now and BMI is < 45 - will get auth for TKA. Has psoriasis but never affecting left knee. 1/14/20: Cont difficulty and waiting for left TKA auth. 2/11/20: No change - TKA was denied but has appeal hearing in 2 weeks. Cont weight loss. 5/13/20: continues to work on weight loss. His Pain and loss of of function continues to progress. He has to take pain medication on a daily basis due how severe his pain is. At this time i believe the benefits outweigh the risks. He will continue on weight loss and our goal of TKA. Refilled his duexis. 8/11/20: No change - has deposition scheduled in Aug. 9/22/20: No change - currently 330 lbs and discussed need to be < 300 lbs for TKA. Awaiting auth. At one point was 280 lbs but gained some back during pandemic. 11/3/20: TKA authorized but unable to proceed as he is struggling with weight loss. BMI too high to proceed with surgery and needs to be < 300, ideally 280s to have TKA. 12/15/20: Continued pain - No change in weight. Will continue to work on weight loss and continue with home exercises. Cortisone injection today. 3/16/21: Still with significant pain - weight loss has not progressed and he has been around 345 the past few months - no overall loss from bariatrics. Not a TKA candidate at this time due to weight. Will try orthovisc again. 4/16/21: Inj tolerated well - asp 5cc. 4/27/21: Inj tolerated well. 5/3/21: Inj tolerated well. 5/11/21: Inj tolerated well. 6/8/21 he has slow wt loss down to 337 - dealing with neuropathy and DM -some relief with Inj and he is concerned he may be disabled y 7/20/21 he is unable to retrun to work due to pain but did have some relief with Knee inj - cont o struggle with wt loss 10/19/21: Symptoms unchanged, he has responded to orthovisc and will make another request as he can restart series early-mid November 2021. Weight loss discussed. He will continue OOW. 12/21/21: No change in symptoms. Will discuss "granted w prejudice" with WC as clarification is needed of what this means. f/up in 4 weeks. He will continue OOW 1/18/22: No change - will start HA injections when aurthorized. Only has granted w prejudice at this time. 3/1/22: No auth required to start orthovisc - he is going away next week and would like to start the series when he returns. 3/15/22: Inj tolerated well. 4/5/22: Inj tolerated well, reeval in 6 weeks. 5/17/22: HA injections helped but still with significant pain levels - cortisone inj today tolerated well. He is very frustrated with this situation and that he has had such difficulty with weight loss. 9/20/22: Repeat cortisone inj today tolreated well, will reeval in 1 month for possible HA injections again. 10/18/22: Cortisone only 1 week relief - request auth for orthovisc as this helped him earlier this year. Not a surgical candidate and pain limits his ability to return to work. 11/15/22: Inj tolerated well. 11/22/22: Inj tolerated well. 11/29/22: Inj tolerated well. 12/6/22: Inj tolerated well. 01/03/23: Doing ok since injections. Cont HEP, weight loss. 3/14/23: Worsening pain - cortisone inj today tolerated well. 6/16/23: Worsening pain - cortisone inj today and will get auth for orthovisc left knee. 7/21/23: Orthovisc #1 L knee today, tolerated well 7/25/23: Inj tolerated well. 8/1/23: Inj tolerated well. 8/8/23: Inj tolerated well. 8/29/23: Cortisone inj today. HA helped a little. 11/28/23: Cortisone helped for a few weeks, still has pain but tolerable for now.  Declined inj today, interested in trying HA injections again in Feb.  2/27/24: Cont pain, will get auth for orthovisc left knee as this has been working for him.

## 2024-04-29 ENCOUNTER — RX RENEWAL (OUTPATIENT)
Age: 64
End: 2024-04-29

## 2024-04-29 RX ORDER — IBUPROFEN 800 MG/1
800 TABLET, FILM COATED ORAL 3 TIMES DAILY
Qty: 90 | Refills: 1 | Status: ACTIVE | COMMUNITY
Start: 2023-03-20 | End: 1900-01-01

## 2024-05-28 ENCOUNTER — APPOINTMENT (OUTPATIENT)
Dept: ORTHOPEDIC SURGERY | Facility: CLINIC | Age: 64
End: 2024-05-28
Payer: OTHER MISCELLANEOUS

## 2024-05-28 VITALS — WEIGHT: 315 LBS | BODY MASS INDEX: 49.44 KG/M2 | HEIGHT: 67 IN

## 2024-05-28 DIAGNOSIS — M17.12 UNILATERAL PRIMARY OSTEOARTHRITIS, LEFT KNEE: ICD-10-CM

## 2024-05-28 PROCEDURE — 73562 X-RAY EXAM OF KNEE 3: CPT | Mod: LT

## 2024-05-28 PROCEDURE — 99213 OFFICE O/P EST LOW 20 MIN: CPT

## 2024-05-28 NOTE — IMAGING
[de-identified] : Left knee:\par  pain med and lateral joint line \par  Crepitus\par  NIVI\par  Strenth 5/5\par  Pulses +2 DP/PT\par  Decreased ROM -\par

## 2024-05-28 NOTE — ASSESSMENT
[FreeTextEntry1] : Previous doc: Adv OA left knee. Failed conservative treatments. BMI 53 today (340 lbs) - needs to be < 300 lbs (BMI < 40 would be 250). Had lap band in 2003 and is open to exploring gastric sleeve. 4/30/19: Continued pain - working on getting date for bariatrics. For now cont duexis prn and will try tramadol. Cortisone inj in the past have only helped for a few days at a time. 5/28/19: Doing well with weight loss and planning for removal of gastric band and may need 2 stage for sleeve. 6/25/19: Stable for now - planning for bariatrics end of next month - he will be unable to work for a while due to this. 9/24/19: Cont pain - working on weight loss (293 lbs today). Injections in the past only a few days relief so will hold on this. 12/17/19: Worsening pain. Weight down to 280s now and BMI is < 45 - will get auth for TKA. Has psoriasis but never affecting left knee. 1/14/20: Cont difficulty and waiting for left TKA auth. 2/11/20: No change - TKA was denied but has appeal hearing in 2 weeks. Cont weight loss. 5/13/20: continues to work on weight loss. His Pain and loss of of function continues to progress. He has to take pain medication on a daily basis due how severe his pain is. At this time i believe the benefits outweigh the risks. He will continue on weight loss and our goal of TKA. Refilled his duexis. 8/11/20: No change - has deposition scheduled in Aug. 9/22/20: No change - currently 330 lbs and discussed need to be < 300 lbs for TKA. Awaiting auth. At one point was 280 lbs but gained some back during pandemic. 11/3/20: TKA authorized but unable to proceed as he is struggling with weight loss. BMI too high to proceed with surgery and needs to be < 300, ideally 280s to have TKA. 12/15/20: Continued pain - No change in weight. Will continue to work on weight loss and continue with home exercises. Cortisone injection today. 3/16/21: Still with significant pain - weight loss has not progressed and he has been around 345 the past few months - no overall loss from bariatrics. Not a TKA candidate at this time due to weight. Will try orthovisc again. 4/16/21: Inj tolerated well - asp 5cc. 4/27/21: Inj tolerated well. 5/3/21: Inj tolerated well. 5/11/21: Inj tolerated well. 6/8/21 he has slow wt loss down to 337 - dealing with neuropathy and DM -some relief with Inj and he is concerned he may be disabled 7/20/21 he is unable to retrun to work due to pain but did have some relief with Knee inj - cont o struggle with wt loss 10/19/21: Symptoms unchanged, he has responded to orthovisc and will make another request as he can restart series early-mid November 2021. Weight loss discussed. He will continue OOW. 12/21/21: No change in symptoms. Will discuss "granted w prejudice" with WC as clarification is needed of what this means. f/up in 4 weeks. He will continue OOW 1/18/22: No change - will start HA injections when aurthorized. Only has granted w prejudice at this time. 3/1/22: No auth required to start orthovisc - he is going away next week and would like to start the series when he returns. 3/15/22: Inj tolerated well. 4/5/22: Inj tolerated well, reeval in 6 weeks. 5/17/22: HA injections helped but still with significant pain levels - cortisone inj today tolerated well. He is very frustrated with this situation and that he has had such difficulty with weight loss. 9/20/22: Repeat cortisone inj today tolreated well, will reeval in 1 month for possible HA injections again. 10/18/22: Cortisone only 1 week relief - request auth for orthovisc as this helped him earlier this year. Not a surgical candidate and pain limits his ability to return to work. 11/15/22: Inj tolerated well. 11/22/22: Inj tolerated well. 11/29/22: Inj tolerated well. 12/6/22: Inj tolerated well. 01/03/23: Doing ok since injections. Cont HEP, weight loss. 3/14/23: Worsening pain - cortisone inj today tolerated well. 6/16/23: Worsening pain - cortisone inj today and will get auth for orthovisc left knee. 7/21/23: Orthovisc #1 L knee today, tolerated well 7/25/23: Inj tolerated well. 8/1/23: Inj tolerated well. 8/8/23: Inj tolerated well. 8/29/23: Cortisone inj today. HA helped a little. 11/28/23: Cortisone helped for a few weeks, still has pain but tolerable for now.  Declined inj today, interested in trying HA injections again in Feb. 2/27/24: Cont pain, will get auth for orthovisc left knee as this has been working for him.   5/28/24: Adv L knee OA. Gel injections were denied. Discussed weight loss- he is currently on Ozempic and BMI of 48.87 today (down from 51.69). Most recent A1C was 5.9. He also has hx of psorasis, but no active plaques on his knees. Will request auth for L TKA LUCERO. Will order CT once auth is obtained. f/up 6 weeks.

## 2024-05-28 NOTE — HISTORY OF PRESENT ILLNESS
[6] : 6 [de-identified] : 5/28/24: f/up L knee. Pain continues to gradually worsen. Gel inj had been denied. Would like to discuss TKA today  Previous doc: WC 2/27/13. Left knee pain for several years, fell at work sustaining meniscus tear. Prior to this had scope in 2007 with some relief. Over the years progressive worsening pain. had success in the past with HA and cortisone injections but this past year had less relief.. Cortisone inj 2 weeks ago only helped a few days. Lap band 2003 with 40 lb weight loss. Mult diets in the past always with  lb loss but then gains back. 4/30/19: Continued pain, started seeing bariatric surgeon and is on meal replacement plan. Down to 334 lbs. 5/28/19: Cont pain - lost another 10 lbs since last visit. Seeing bariatrics - may need 2 stage procedure to remove band and then get sleeve. 6/25/19: Cont pain - had to stop NSAIDs because of upcoming bariatric surgery. Taking Tylenol currently with very little relief. Surgery tentatively scheduled 7/24. 9/24/19: Cont pain. Had bariatic surgery in July. 12/9/19 Orthovisc #2 right knee. 12/17/19: Worsening pain - down to 287 lbs. 1/14/20: Cont pain, no change in weight. 2/11/20: TKA was denied, has hearing in 2 weeks. No change in weight recently. 5/13/20: Patient continues to having worsening pain. Has difficulty sleeping. His pain affects his daily life. Reports no change in weight since last visit. Would like a refill on his Duexis. Patient is not currently working due to COVID-19.  8/11/20: Cont pain, awaiting auth for TKA but deposition was rescheduled. now for 8/31? 9/22/20: No change - waiting for TKA auth. 11/3/20: Cont pain, TKA was authorized but struggling with weight loss. 12/15/20: Worsening left knee pain. Has been unsuccessful with weight loss. Started Ozympic this week. 3/16/21: Cont knee pain. Cortisone inj in Dec helped for 1-2 weeks. 4/16/21: Orthovisc #1 left knee. 4/27/21: Orthovisc #2 left knee. 5/4/21: Orthovisc #3 left knee. 5/11/21: Orthovisc #4 left knee. 6/8/21: Since last injection, he has had dull pain in left knee and does not have any clicking in left knee. Has been able to sleep at night and walk smoothly. knee still occ gives out on him  7/20 cont to have sig pain but inj helped - all medial pain --  10/19/21: f/u LT knee, symptoms persist. No significant change. HA in the past with relief. 12/21/21: No change in symptoms from last visit. 1/18/22: Cont pain, no auth yet for HA injections. 3/1/22: Cont pain. 3/15/22: Orthovisc #1 left knee. 4/5/22: Orthovisc #4 left knee, some improvement overall. 5/17/22: HA injections helped but still has pain. 9/20/22: Had about 4 months relief from CSI   10/18/22: Cortisone gave 1 week relief then pain returned. 11/15/22: Orthovisc L KNEE #1 11/22/22: Orthovisc #2 left knee. 11/29/22: Orthovisc #3 left knee. 12/6/22: Orthovisc #4 left knee. 01/03/23: HA injections helped but still has pain. 3/14/23: Worsening pain recently. 6/16/23: Cortisone helped for a couple of weeks. 7/21/23: Here for Orthovisc #1 left knee. 7/25/23: Orthovisc #2 left knee. 8//23: Orthovisc #3 left knee. 8/8/23: Orthovisc #4 left knee. 8/29/23: HA helped but still a lot of pain. 11/28/23: Cortisone inj helped for about a month then pain returned. 2/27/24: Cont pain and occasional locking in left knee. [] : no [FreeTextEntry5] : was denied for gel injection. wants to discuss surgery.

## 2024-05-28 NOTE — DISCUSSION/SUMMARY
[de-identified] : The patient was advised of the diagnosis.  The natural history of the pathology was explained in full to the patient in layman's terms. All questions were answered.  The risks and benefits of surgical and non-surgical treatment alternatives were explained in full to the patient.  The natural progression of Osteoarthritis was explained to the patient.  We discussed the possible treatment options from conservative to operative.  These included NSAIDS, Glucosamine and Chondroitin sulfate, and Physical Therapy as well different types of injections.  We also discussed that at some point they may progress to needing a TKA.  Information and pamphlets were given when appropriate.   Patient Complains of pain in Knee with a level that often reaches greater than a 8/10. The Pain has been progressively worsening of his/her treatment course. The pain has interfered with their ADLs and worsens with weight bearing. On exam they often have episodes of swelling/effusion with limited ROM. Pain worsens with ROM passive and active and I can palpate crepitus.   X-rays were reviewed with the patient, and they show joint space narrowing, subchondral sclerosis, osteophyte formation, and subchondral cysts.   After a period of more than 12 weeks physical therapy or exercise program done with me or another treating physician, they have continued pain. The patient has failed a trial of NSAID medication or pain relievers if they were unable to tolerate NSAID medications as well as a series of injection, steroid or Hyaluronic Acid. After a long discussion with the patient both the patient and I have decided we have exhausted all forms of less radical treatments, and they would like to proceed with Total Knee Replacement  Patient has not had and/or will have any IA knee injection within 12 weeks prior to surgery.   We discussed my findings and the natural history of their condition.  We talked about the details of the proposed surgery and the recovery.  We discussed the material risks, possible benefits and alternatives to surgery.  The risks include but are not limited to infection, bleeding and possible need for blood transfusion, fracture, bowel blockage, bladder retention or infection, need for reoperation, stiffness and/or limited range of motion, possible damage to nerves and blood vessels, failure of fixation of components, risk of deep vein thromboses and pulmonary embolism, wound healing problems, dislocation, and possible leg length discrepancy.  Although incredibly rare, we also discussed the risks of a cardiac event, stroke and even death during, or following, the surgery.  We discussed the type of implants the patient will be receiving and the type of fixation that will be used, as well as whether a robot or computer navigation aide will be used.  The patient understands they will need medical clearance and will attend a preoperative joint education class.  We also discussed the type of anesthesia they will receive, and the risks associated with hospital or rehab length of stay, obesity, diabetes and smoking.  Progress note completed by Pati Lopez PA-C, acting as scribe.

## 2024-05-29 ENCOUNTER — APPOINTMENT (OUTPATIENT)
Dept: ORTHOPEDIC SURGERY | Facility: CLINIC | Age: 64
End: 2024-05-29
Payer: OTHER MISCELLANEOUS

## 2024-05-29 VITALS — WEIGHT: 312 LBS | BODY MASS INDEX: 48.97 KG/M2 | HEIGHT: 67 IN

## 2024-05-29 PROCEDURE — 73564 X-RAY EXAM KNEE 4 OR MORE: CPT | Mod: RT

## 2024-05-29 PROCEDURE — 99213 OFFICE O/P EST LOW 20 MIN: CPT

## 2024-05-29 NOTE — HISTORY OF PRESENT ILLNESS
[Work related] : work related [Sudden] : sudden [8] : 8 [4] : 4 [Dull/Aching] : dull/aching [Throbbing] : throbbing [Intermittent] : intermittent [Rest] : rest [Meds] : meds [Sitting] : sitting [Walking] : walking [Stairs] : stairs [Lying in bed] : lying in bed [Not working due to injury] : Work status: not working due to injury [de-identified] : Patient has persistent symptoms in the right knee, pain with walking, stairs. He has completed visco in the past with help, typically for about 5 months or so. The injections helped to improve ADL's and decrease pain.  [] : no [FreeTextEntry1] : RT knee  [FreeTextEntry3] : 01/13/14 [FreeTextEntry5] : Patient still having pain.  [FreeTextEntry9] : Motrin

## 2024-05-29 NOTE — DISCUSSION/SUMMARY
[de-identified] : Will repeat Visco in February.  IBU renewed. Return in 2 months for re-eval.   Let this serve as a formal request for authorization Orthovisc right knee. 05/29/2024  RE:  SANDRA JEWELL   Acct #- [id number]  Attention:  Nurse Reviewer /Medical Director  I am writing this letter as a medical necessity for HA Orthovisc Patient has tried analgesics, non-steroid anti-inflammatory agents,  physical therapy, hot or cold compresses,injections of corticosteroids, etc)  which in combination or by themselves has not worked. Based on my patient's condition, I strongly believe that the Hyaluronic aid injections is medically needed.   Thank you for your time and consideration.

## 2024-05-29 NOTE — PHYSICAL EXAM
[5___] : hamstring 5[unfilled]/5 [] : patient ambulates without assistive device [Right] : right knee [All Views] : anteroposterior, lateral, skyline, and anteroposterior standing [Moderate tricompartmental OA medial narrowing] : Moderate tricompartmental OA medial narrowing [Moderate patellofemoral OA] : Moderate patellofemoral OA [FreeTextEntry9] : no significant progression from 2019 [TWNoteComboBox7] : flexion 100 degrees [de-identified] : extension 0 degrees

## 2024-06-03 ENCOUNTER — APPOINTMENT (OUTPATIENT)
Dept: ORTHOPEDIC SURGERY | Facility: CLINIC | Age: 64
End: 2024-06-03
Payer: OTHER MISCELLANEOUS

## 2024-06-03 PROCEDURE — 99213 OFFICE O/P EST LOW 20 MIN: CPT | Mod: 25

## 2024-06-03 PROCEDURE — 20611 DRAIN/INJ JOINT/BURSA W/US: CPT | Mod: RT

## 2024-06-03 NOTE — PROCEDURE
[FreeTextEntry3] : Orthovisc (Large Joint) with Ultrasound Guidance Viscosupplementation Injection: X-ray evidence of Osteoarthritis on this or prior visit and Patient has tried OTC's including aspirin, Ibuprofen, Aleve etc or prescription NSAIDS, and/or exercises at home and/ or physical therapy without satisfactory response.  An injection of Orthovisc 2ml #__1__ was injected into the right knee(s). after verbal consent using sterile technique. The risks, benefits, and alternatives to Viscosupplementation injection were explained in full to the patient. Risks outlined include but are not limited to infection, sepsis, bleeding, scarring, skin discoloration, temporary increase in pain, syncopal episode, failure to resolve symptoms, allergic reaction, and symptom recurrence. Signs and symptoms of infection reviewed and patient advised to call immediately for redness, fevers, and/or chills. Patient understood the risks. All questions were answered. After discussion of options, patient requested Viscosupplementation. Oral informed consent was obtained and sterile prep was done of the injection site. Sterile technique was used without complications. The patient tolerated the procedure well. Ice tonight to the injection site.   Ultrasound Guidance was used for the following reasons: altered anatomic landmarks because of erosive arthritis.   Ultrasound guided injection was performed of the knee, visualization of the needle and placement of injection was performed without complication.

## 2024-06-03 NOTE — PHYSICAL EXAM
[Right] : right knee [5___] : hamstring 5[unfilled]/5 [] : no medial joint line tenderness [TWNoteComboBox7] : flexion 100 degrees [de-identified] : extension 0 degrees

## 2024-06-03 NOTE — HISTORY OF PRESENT ILLNESS
[de-identified] : Patient has persistent symptoms in the right knee, pain with walking, stairs. He has completed visco in the past with help, typically for about 5 months or so. The injections helped to improve ADL's and decrease pain. Returns to start Orthovisc series, we received authorization.

## 2024-06-10 ENCOUNTER — APPOINTMENT (OUTPATIENT)
Dept: ORTHOPEDIC SURGERY | Facility: CLINIC | Age: 64
End: 2024-06-10
Payer: OTHER MISCELLANEOUS

## 2024-06-10 PROCEDURE — 99213 OFFICE O/P EST LOW 20 MIN: CPT | Mod: 25

## 2024-06-10 PROCEDURE — 20611 DRAIN/INJ JOINT/BURSA W/US: CPT | Mod: RT

## 2024-06-10 NOTE — PHYSICAL EXAM
[Right] : right knee [5___] : hamstring 5[unfilled]/5 [] : no medial joint line tenderness [TWNoteComboBox7] : flexion 100 degrees [de-identified] : extension 0 degrees

## 2024-06-10 NOTE — HISTORY OF PRESENT ILLNESS
[2] : 2 [Orthovisc] : Orthovisc [de-identified] : Patient has persistent symptoms in the right knee, returns for Orthovisc #2 right knee.  [de-identified] : 6/3/24

## 2024-06-10 NOTE — PROCEDURE
[FreeTextEntry3] : Orthovisc (Large Joint) with Ultrasound Guidance Viscosupplementation Injection: X-ray evidence of Osteoarthritis on this or prior visit and Patient has tried OTC's including aspirin, Ibuprofen, Aleve etc or prescription NSAIDS, and/or exercises at home and/ or physical therapy without satisfactory response.  An injection of Orthovisc 2ml #__2__ was injected into the right knee(s). after verbal consent using sterile technique. The risks, benefits, and alternatives to Viscosupplementation injection were explained in full to the patient. Risks outlined include but are not limited to infection, sepsis, bleeding, scarring, skin discoloration, temporary increase in pain, syncopal episode, failure to resolve symptoms, allergic reaction, and symptom recurrence. Signs and symptoms of infection reviewed and patient advised to call immediately for redness, fevers, and/or chills. Patient understood the risks. All questions were answered. After discussion of options, patient requested Viscosupplementation. Oral informed consent was obtained and sterile prep was done of the injection site. Sterile technique was used without complications. The patient tolerated the procedure well. Ice tonight to the injection site.   Ultrasound Guidance was used for the following reasons: altered anatomic landmarks because of erosive arthritis.   Ultrasound guided injection was performed of the knee, visualization of the needle and placement of injection was performed without complication.

## 2024-06-17 ENCOUNTER — APPOINTMENT (OUTPATIENT)
Dept: ORTHOPEDIC SURGERY | Facility: CLINIC | Age: 64
End: 2024-06-17
Payer: OTHER MISCELLANEOUS

## 2024-06-17 PROCEDURE — 99213 OFFICE O/P EST LOW 20 MIN: CPT | Mod: 25

## 2024-06-17 PROCEDURE — 20611 DRAIN/INJ JOINT/BURSA W/US: CPT | Mod: RT

## 2024-06-17 NOTE — HISTORY OF PRESENT ILLNESS
[de-identified] : Patient has persistent symptoms in the right knee, Orthovisc #2 was tolerated well. He has seen some improvement.

## 2024-06-17 NOTE — PHYSICAL EXAM
[Right] : right knee [5___] : hamstring 5[unfilled]/5 [] : no medial joint line tenderness [TWNoteComboBox7] : flexion 100 degrees [de-identified] : extension 0 degrees

## 2024-06-17 NOTE — PROCEDURE
[FreeTextEntry3] : Orthovisc (Large Joint) with Ultrasound Guidance Viscosupplementation Injection: X-ray evidence of Osteoarthritis on this or prior visit and Patient has tried OTC's including aspirin, Ibuprofen, Aleve etc or prescription NSAIDS, and/or exercises at home and/ or physical therapy without satisfactory response.  An injection of Orthovisc 2ml #__3__ was injected into the right knee(s). after verbal consent using sterile technique. The risks, benefits, and alternatives to Viscosupplementation injection were explained in full to the patient. Risks outlined include but are not limited to infection, sepsis, bleeding, scarring, skin discoloration, temporary increase in pain, syncopal episode, failure to resolve symptoms, allergic reaction, and symptom recurrence. Signs and symptoms of infection reviewed and patient advised to call immediately for redness, fevers, and/or chills. Patient understood the risks. All questions were answered. After discussion of options, patient requested Viscosupplementation. Oral informed consent was obtained and sterile prep was done of the injection site. Sterile technique was used without complications. The patient tolerated the procedure well. Ice tonight to the injection site.   Ultrasound Guidance was used for the following reasons: altered anatomic landmarks because of erosive arthritis.   Ultrasound guided injection was performed of the knee, visualization of the needle and placement of injection was performed without complication.

## 2024-06-24 ENCOUNTER — APPOINTMENT (OUTPATIENT)
Dept: ORTHOPEDIC SURGERY | Facility: CLINIC | Age: 64
End: 2024-06-24
Payer: OTHER MISCELLANEOUS

## 2024-06-24 DIAGNOSIS — M17.31 UNILATERAL POST-TRAUMATIC OSTEOARTHRITIS, RIGHT KNEE: ICD-10-CM

## 2024-06-24 PROCEDURE — 20611 DRAIN/INJ JOINT/BURSA W/US: CPT | Mod: RT

## 2024-06-24 PROCEDURE — 99212 OFFICE O/P EST SF 10 MIN: CPT | Mod: 25

## 2024-06-25 ENCOUNTER — NON-APPOINTMENT (OUTPATIENT)
Age: 64
End: 2024-06-25

## 2024-07-09 ENCOUNTER — APPOINTMENT (OUTPATIENT)
Dept: ORTHOPEDIC SURGERY | Facility: CLINIC | Age: 64
End: 2024-07-09

## 2024-07-10 ENCOUNTER — APPOINTMENT (OUTPATIENT)
Dept: ORTHOPEDIC SURGERY | Facility: CLINIC | Age: 64
End: 2024-07-10

## 2024-07-19 ENCOUNTER — APPOINTMENT (OUTPATIENT)
Dept: ORTHOPEDIC SURGERY | Facility: CLINIC | Age: 64
End: 2024-07-19
Payer: OTHER MISCELLANEOUS

## 2024-07-19 DIAGNOSIS — E66.01 MORBID (SEVERE) OBESITY DUE TO EXCESS CALORIES: ICD-10-CM

## 2024-07-19 DIAGNOSIS — M17.12 UNILATERAL PRIMARY OSTEOARTHRITIS, LEFT KNEE: ICD-10-CM

## 2024-07-19 PROCEDURE — 99213 OFFICE O/P EST LOW 20 MIN: CPT

## 2024-08-07 ENCOUNTER — NON-APPOINTMENT (OUTPATIENT)
Age: 64
End: 2024-08-07

## 2024-08-12 ENCOUNTER — APPOINTMENT (OUTPATIENT)
Dept: CT IMAGING | Facility: CLINIC | Age: 64
End: 2024-08-12
Payer: OTHER MISCELLANEOUS

## 2024-08-12 PROCEDURE — 73700 CT LOWER EXTREMITY W/O DYE: CPT | Mod: LT

## 2024-08-20 ENCOUNTER — NON-APPOINTMENT (OUTPATIENT)
Age: 64
End: 2024-08-20

## 2024-08-26 ENCOUNTER — APPOINTMENT (OUTPATIENT)
Dept: ORTHOPEDIC SURGERY | Facility: CLINIC | Age: 64
End: 2024-08-26
Payer: OTHER MISCELLANEOUS

## 2024-08-26 DIAGNOSIS — M17.31 UNILATERAL POST-TRAUMATIC OSTEOARTHRITIS, RIGHT KNEE: ICD-10-CM

## 2024-08-26 PROBLEM — K21.9 GASTRO-ESOPHAGEAL REFLUX DISEASE WITHOUT ESOPHAGITIS: Chronic | Status: ACTIVE | Noted: 2024-08-15

## 2024-08-26 PROBLEM — E78.5 HYPERLIPIDEMIA, UNSPECIFIED: Chronic | Status: ACTIVE | Noted: 2024-08-15

## 2024-08-26 PROBLEM — I10 ESSENTIAL (PRIMARY) HYPERTENSION: Chronic | Status: ACTIVE | Noted: 2024-08-15

## 2024-08-26 PROCEDURE — 99214 OFFICE O/P EST MOD 30 MIN: CPT

## 2024-08-26 NOTE — PHYSICAL EXAM
[Right] : right knee [5___] : hamstring 5[unfilled]/5 [] : patient ambulates without assistive device [FreeTextEntry8] : mild [TWNoteComboBox7] : flexion 105 degrees [de-identified] : extension 0 degrees

## 2024-08-26 NOTE — DISCUSSION/SUMMARY
[de-identified] : modify activities use elastic sleeve for structural support try OTC meds ice as needed try topical lidocaine for pain control reviewed current medications used by this patient home exercises for functional return

## 2024-08-26 NOTE — HISTORY OF PRESENT ILLNESS
[de-identified] : Patient has persistent symptoms in the right knee, Orthovisc was tolerated well. He has seen some improvement. , no swelling`, worse when walks a lot

## 2024-09-03 ENCOUNTER — TRANSCRIPTION ENCOUNTER (OUTPATIENT)
Age: 64
End: 2024-09-03

## 2024-09-04 ENCOUNTER — INPATIENT (INPATIENT)
Facility: HOSPITAL | Age: 64
LOS: 0 days | Discharge: HOME | End: 2024-09-05
Attending: ORTHOPAEDIC SURGERY | Admitting: ORTHOPAEDIC SURGERY
Payer: OTHER MISCELLANEOUS

## 2024-09-04 ENCOUNTER — TRANSCRIPTION ENCOUNTER (OUTPATIENT)
Age: 64
End: 2024-09-04

## 2024-09-04 ENCOUNTER — APPOINTMENT (OUTPATIENT)
Dept: ORTHOPEDIC SURGERY | Facility: HOSPITAL | Age: 64
End: 2024-09-04
Payer: OTHER MISCELLANEOUS

## 2024-09-04 VITALS
HEIGHT: 67 IN | DIASTOLIC BLOOD PRESSURE: 87 MMHG | WEIGHT: 307.77 LBS | OXYGEN SATURATION: 97 % | HEART RATE: 94 BPM | TEMPERATURE: 98 F | RESPIRATION RATE: 17 BRPM | SYSTOLIC BLOOD PRESSURE: 141 MMHG

## 2024-09-04 DIAGNOSIS — Z98.890 OTHER SPECIFIED POSTPROCEDURAL STATES: Chronic | ICD-10-CM

## 2024-09-04 DIAGNOSIS — Z90.3 ACQUIRED ABSENCE OF STOMACH [PART OF]: Chronic | ICD-10-CM

## 2024-09-04 DIAGNOSIS — Z98.84 BARIATRIC SURGERY STATUS: Chronic | ICD-10-CM

## 2024-09-04 DIAGNOSIS — Z90.49 ACQUIRED ABSENCE OF OTHER SPECIFIED PARTS OF DIGESTIVE TRACT: Chronic | ICD-10-CM

## 2024-09-04 LAB
ANION GAP SERPL CALC-SCNC: 3 MMOL/L — LOW (ref 5–17)
BUN SERPL-MCNC: 16 MG/DL — SIGNIFICANT CHANGE UP (ref 7–23)
CALCIUM SERPL-MCNC: 9.1 MG/DL — SIGNIFICANT CHANGE UP (ref 8.5–10.1)
CHLORIDE SERPL-SCNC: 105 MMOL/L — SIGNIFICANT CHANGE UP (ref 96–108)
CO2 SERPL-SCNC: 29 MMOL/L — SIGNIFICANT CHANGE UP (ref 22–31)
CREAT SERPL-MCNC: 0.93 MG/DL — SIGNIFICANT CHANGE UP (ref 0.5–1.3)
EGFR: 92 ML/MIN/1.73M2 — SIGNIFICANT CHANGE UP
GLUCOSE BLDC GLUCOMTR-MCNC: 120 MG/DL — HIGH (ref 70–99)
GLUCOSE BLDC GLUCOMTR-MCNC: 132 MG/DL — HIGH (ref 70–99)
GLUCOSE BLDC GLUCOMTR-MCNC: 205 MG/DL — HIGH (ref 70–99)
GLUCOSE SERPL-MCNC: 108 MG/DL — HIGH (ref 70–99)
HCT VFR BLD CALC: 36.6 % — LOW (ref 39–50)
HGB BLD-MCNC: 12.2 G/DL — LOW (ref 13–17)
MCHC RBC-ENTMCNC: 30.3 PG — SIGNIFICANT CHANGE UP (ref 27–34)
MCHC RBC-ENTMCNC: 33.3 G/DL — SIGNIFICANT CHANGE UP (ref 32–36)
MCV RBC AUTO: 90.8 FL — SIGNIFICANT CHANGE UP (ref 80–100)
NRBC # BLD: 0 /100 WBCS — SIGNIFICANT CHANGE UP (ref 0–0)
PLATELET # BLD AUTO: 251 K/UL — SIGNIFICANT CHANGE UP (ref 150–400)
POTASSIUM SERPL-MCNC: 4.8 MMOL/L — SIGNIFICANT CHANGE UP (ref 3.5–5.3)
POTASSIUM SERPL-SCNC: 4.8 MMOL/L — SIGNIFICANT CHANGE UP (ref 3.5–5.3)
RBC # BLD: 4.03 M/UL — LOW (ref 4.2–5.8)
RBC # FLD: 13.9 % — SIGNIFICANT CHANGE UP (ref 10.3–14.5)
SODIUM SERPL-SCNC: 137 MMOL/L — SIGNIFICANT CHANGE UP (ref 135–145)
WBC # BLD: 17.52 K/UL — HIGH (ref 3.8–10.5)
WBC # FLD AUTO: 17.52 K/UL — HIGH (ref 3.8–10.5)

## 2024-09-04 PROCEDURE — 73560 X-RAY EXAM OF KNEE 1 OR 2: CPT | Mod: 26,LT

## 2024-09-04 PROCEDURE — 20985 CPTR-ASST DIR MS PX: CPT

## 2024-09-04 PROCEDURE — 27447 TOTAL KNEE ARTHROPLASTY: CPT | Mod: LT

## 2024-09-04 DEVICE — MAKO BONE PIN 3.2MM X 140MM: Type: IMPLANTABLE DEVICE | Site: LEFT | Status: FUNCTIONAL

## 2024-09-04 DEVICE — INSERT TIB BEARING CS X3 SZ 6 11MM: Type: IMPLANTABLE DEVICE | Site: LEFT | Status: FUNCTIONAL

## 2024-09-04 DEVICE — PATELLA TRIATHLON ASSYM SZ A3X10MM: Type: IMPLANTABLE DEVICE | Site: LEFT | Status: FUNCTIONAL

## 2024-09-04 DEVICE — MAKO BONE PIN 3.2MM X 110MM: Type: IMPLANTABLE DEVICE | Site: LEFT | Status: FUNCTIONAL

## 2024-09-04 DEVICE — COMP FEM CR CMNTLSS BEADED W/ PA SZ 5 LT: Type: IMPLANTABLE DEVICE | Site: LEFT | Status: FUNCTIONAL

## 2024-09-04 DEVICE — BASEPLATE TIB TRIATHLON TRITAN SZ 6: Type: IMPLANTABLE DEVICE | Site: LEFT | Status: FUNCTIONAL

## 2024-09-04 RX ORDER — HYDROMORPHONE HYDROCHLORIDE 2 MG/1
0.5 TABLET ORAL ONCE
Refills: 0 | Status: DISCONTINUED | OUTPATIENT
Start: 2024-09-04 | End: 2024-09-05

## 2024-09-04 RX ORDER — OXYCODONE HYDROCHLORIDE 5 MG/1
5 TABLET ORAL
Refills: 0 | Status: DISCONTINUED | OUTPATIENT
Start: 2024-09-04 | End: 2024-09-05

## 2024-09-04 RX ORDER — ONDANSETRON 2 MG/ML
4 INJECTION, SOLUTION INTRAMUSCULAR; INTRAVENOUS EVERY 6 HOURS
Refills: 0 | Status: DISCONTINUED | OUTPATIENT
Start: 2024-09-04 | End: 2024-09-05

## 2024-09-04 RX ORDER — ACETAMINOPHEN 325 MG/1
650 TABLET ORAL ONCE
Refills: 0 | Status: COMPLETED | OUTPATIENT
Start: 2024-09-04 | End: 2024-09-04

## 2024-09-04 RX ORDER — SODIUM CHLORIDE 9 MG/ML
1000 INJECTION INTRAMUSCULAR; INTRAVENOUS; SUBCUTANEOUS
Refills: 0 | Status: DISCONTINUED | OUTPATIENT
Start: 2024-09-04 | End: 2024-09-05

## 2024-09-04 RX ORDER — OXYCODONE HYDROCHLORIDE 5 MG/1
10 TABLET ORAL
Refills: 0 | Status: DISCONTINUED | OUTPATIENT
Start: 2024-09-04 | End: 2024-09-05

## 2024-09-04 RX ORDER — ASPIRIN 81 MG
81 TABLET, DELAYED RELEASE (ENTERIC COATED) ORAL
Refills: 0 | Status: DISCONTINUED | OUTPATIENT
Start: 2024-09-05 | End: 2024-09-05

## 2024-09-04 RX ORDER — DEXTROSE 15 G/33 G
25 GEL IN PACKET (GRAM) ORAL ONCE
Refills: 0 | Status: DISCONTINUED | OUTPATIENT
Start: 2024-09-04 | End: 2024-09-05

## 2024-09-04 RX ORDER — ACETAMINOPHEN 325 MG/1
1000 TABLET ORAL EVERY 8 HOURS
Refills: 0 | Status: DISCONTINUED | OUTPATIENT
Start: 2024-09-04 | End: 2024-09-05

## 2024-09-04 RX ORDER — DEXTROSE 15 G/33 G
12.5 GEL IN PACKET (GRAM) ORAL ONCE
Refills: 0 | Status: DISCONTINUED | OUTPATIENT
Start: 2024-09-04 | End: 2024-09-05

## 2024-09-04 RX ORDER — DEXTROSE 15 G/33 G
15 GEL IN PACKET (GRAM) ORAL ONCE
Refills: 0 | Status: DISCONTINUED | OUTPATIENT
Start: 2024-09-04 | End: 2024-09-05

## 2024-09-04 RX ORDER — HYDROMORPHONE HYDROCHLORIDE 2 MG/1
0.2 TABLET ORAL
Refills: 0 | Status: DISCONTINUED | OUTPATIENT
Start: 2024-09-04 | End: 2024-09-04

## 2024-09-04 RX ORDER — OXYCODONE HYDROCHLORIDE 5 MG/1
15 TABLET ORAL EVERY 4 HOURS
Refills: 0 | Status: DISCONTINUED | OUTPATIENT
Start: 2024-09-04 | End: 2024-09-05

## 2024-09-04 RX ORDER — ONDANSETRON 2 MG/ML
4 INJECTION, SOLUTION INTRAMUSCULAR; INTRAVENOUS ONCE
Refills: 0 | Status: DISCONTINUED | OUTPATIENT
Start: 2024-09-04 | End: 2024-09-04

## 2024-09-04 RX ORDER — GLUCAGON INJECTION, SOLUTION 1 MG/.2ML
1 INJECTION, SOLUTION SUBCUTANEOUS ONCE
Refills: 0 | Status: DISCONTINUED | OUTPATIENT
Start: 2024-09-04 | End: 2024-09-05

## 2024-09-04 RX ORDER — POLYETHYLENE GLYCOL 3350 17 G/17G
17 POWDER, FOR SOLUTION ORAL AT BEDTIME
Refills: 0 | Status: DISCONTINUED | OUTPATIENT
Start: 2024-09-04 | End: 2024-09-05

## 2024-09-04 RX ORDER — DEXAMETHASONE 0.75 MG
10 TABLET ORAL ONCE
Refills: 0 | Status: COMPLETED | OUTPATIENT
Start: 2024-09-05 | End: 2024-09-05

## 2024-09-04 RX ORDER — SENNA 187 MG
2 TABLET ORAL AT BEDTIME
Refills: 0 | Status: DISCONTINUED | OUTPATIENT
Start: 2024-09-04 | End: 2024-09-05

## 2024-09-04 RX ORDER — SODIUM CHLORIDE 9 MG/ML
3 INJECTION INTRAMUSCULAR; INTRAVENOUS; SUBCUTANEOUS EVERY 8 HOURS
Refills: 0 | Status: DISCONTINUED | OUTPATIENT
Start: 2024-09-04 | End: 2024-09-04

## 2024-09-04 RX ORDER — PANTOPRAZOLE SODIUM 40 MG
40 TABLET, DELAYED RELEASE (ENTERIC COATED) ORAL
Refills: 0 | Status: DISCONTINUED | OUTPATIENT
Start: 2024-09-04 | End: 2024-09-05

## 2024-09-04 RX ORDER — KETOROLAC TROMETHAMINE 30 MG/ML
30 INJECTION, SOLUTION INTRAMUSCULAR EVERY 8 HOURS
Refills: 0 | Status: DISCONTINUED | OUTPATIENT
Start: 2024-09-04 | End: 2024-09-05

## 2024-09-04 RX ORDER — CELECOXIB 400 MG/1
200 CAPSULE ORAL EVERY 12 HOURS
Refills: 0 | Status: DISCONTINUED | OUTPATIENT
Start: 2024-09-05 | End: 2024-09-05

## 2024-09-04 RX ORDER — CEFAZOLIN SODIUM 2 G/100ML
2000 INJECTION, SOLUTION INTRAVENOUS EVERY 8 HOURS
Refills: 0 | Status: COMPLETED | OUTPATIENT
Start: 2024-09-04 | End: 2024-09-05

## 2024-09-04 RX ORDER — LOSARTAN POTASSIUM 50 MG/1
100 TABLET ORAL DAILY
Refills: 0 | Status: DISCONTINUED | OUTPATIENT
Start: 2024-09-04 | End: 2024-09-05

## 2024-09-04 RX ORDER — ACETAMINOPHEN 325 MG/1
1000 TABLET ORAL ONCE
Refills: 0 | Status: COMPLETED | OUTPATIENT
Start: 2024-09-04 | End: 2024-09-04

## 2024-09-04 RX ORDER — CELECOXIB 400 MG/1
200 CAPSULE ORAL ONCE
Refills: 0 | Status: COMPLETED | OUTPATIENT
Start: 2024-09-04 | End: 2024-09-04

## 2024-09-04 RX ORDER — SODIUM CHLORIDE 9 MG/ML
500 INJECTION INTRAMUSCULAR; INTRAVENOUS; SUBCUTANEOUS ONCE
Refills: 0 | Status: COMPLETED | OUTPATIENT
Start: 2024-09-04 | End: 2024-09-04

## 2024-09-04 RX ADMIN — KETOROLAC TROMETHAMINE 30 MILLIGRAM(S): 30 INJECTION, SOLUTION INTRAMUSCULAR at 20:00

## 2024-09-04 RX ADMIN — ACETAMINOPHEN 650 MILLIGRAM(S): 325 TABLET ORAL at 11:23

## 2024-09-04 RX ADMIN — ACETAMINOPHEN 400 MILLIGRAM(S): 325 TABLET ORAL at 15:45

## 2024-09-04 RX ADMIN — OXYCODONE HYDROCHLORIDE 15 MILLIGRAM(S): 5 TABLET ORAL at 15:44

## 2024-09-04 RX ADMIN — SODIUM CHLORIDE 120 MILLILITER(S): 9 INJECTION INTRAMUSCULAR; INTRAVENOUS; SUBCUTANEOUS at 17:19

## 2024-09-04 RX ADMIN — OXYCODONE HYDROCHLORIDE 15 MILLIGRAM(S): 5 TABLET ORAL at 16:40

## 2024-09-04 RX ADMIN — KETOROLAC TROMETHAMINE 30 MILLIGRAM(S): 30 INJECTION, SOLUTION INTRAMUSCULAR at 21:00

## 2024-09-04 RX ADMIN — ACETAMINOPHEN 1000 MILLIGRAM(S): 325 TABLET ORAL at 16:40

## 2024-09-04 RX ADMIN — Medication 2 TABLET(S): at 21:46

## 2024-09-04 RX ADMIN — HYDROMORPHONE HYDROCHLORIDE 0.2 MILLIGRAM(S): 2 TABLET ORAL at 14:46

## 2024-09-04 RX ADMIN — POLYETHYLENE GLYCOL 3350 17 GRAM(S): 17 POWDER, FOR SOLUTION ORAL at 21:46

## 2024-09-04 RX ADMIN — ACETAMINOPHEN 1000 MILLIGRAM(S): 325 TABLET ORAL at 23:40

## 2024-09-04 RX ADMIN — ACETAMINOPHEN 1000 MILLIGRAM(S): 325 TABLET ORAL at 22:41

## 2024-09-04 RX ADMIN — CELECOXIB 200 MILLIGRAM(S): 400 CAPSULE ORAL at 11:24

## 2024-09-04 RX ADMIN — CEFAZOLIN SODIUM 100 MILLIGRAM(S): 2 INJECTION, SOLUTION INTRAVENOUS at 20:02

## 2024-09-04 RX ADMIN — SODIUM CHLORIDE 500 MILLILITER(S): 9 INJECTION INTRAMUSCULAR; INTRAVENOUS; SUBCUTANEOUS at 14:00

## 2024-09-04 RX ADMIN — Medication 10 MILLIGRAM(S): at 21:45

## 2024-09-04 RX ADMIN — HYDROMORPHONE HYDROCHLORIDE 0.2 MILLIGRAM(S): 2 TABLET ORAL at 15:00

## 2024-09-04 NOTE — DISCHARGE NOTE PROVIDER - NSDCFUADDINST_GEN_ALL_CORE_FT
Keep knee straight while at rest. Elevate the leg as much as possible ("toes above the nose") to help control swelling. Make sure you get up and take a brief walk every two hours to help with circulation and prevent stiffness. Incentive spirometer 10X/hour. Cryocuff to help with pain/inflammation.  Per Dr. Ramirez: may advance from walker as tolerated per discretion of physical therapist.  Keep Prineo Dressing Clean, Dry and Intact. May shower with Prineo Dressing. Please do not scrub, soak, peel or pick at the prineo dressing. No creams, lotions, or oils over dressing. May shower and let water run over incision, no baths. Pat dry once out of shower. Dressing to be removed in office at follow up visit in 2 weeks. There are no staples or stitches that need to be removed.  If you notice any redness, irritation, or itching around the prineo dressing call the surgeon's office

## 2024-09-04 NOTE — PATIENT PROFILE ADULT - FALL HARM RISK - HARM RISK INTERVENTIONS

## 2024-09-04 NOTE — BRIEF OPERATIVE NOTE - SECOND ASSIST NAME
FREE GUIDED MEDITATIONS  stressremedFlixster/audio  [facility]/mindful-meditations      EMOTIONAL FREEDOM TECHNIQUES  Youtube videos demonstrate EFT tapping techniques      BOOKS  Relaxation on the Run by Daniela Sheffield  The Anxiety and Phobia Workbook by Yun Houston and Help for Your Nerves by Krystyna Walton  Don't Panic by Charmaine Certain Jose Ng (Fellow)

## 2024-09-04 NOTE — PHYSICAL THERAPY INITIAL EVALUATION ADULT - GENERAL OBSERVATIONS, REHAB EVAL
Chart reviewed. pt encountered on supine, AxOx4, cooperative, + cardiac monitor intact, + IV removed before session

## 2024-09-04 NOTE — DISCHARGE NOTE PROVIDER - CARE PROVIDER_API CALL
Dipak Ramirez.  Orthopaedic Surgery  [address] [facility], [address] [place] [id number]  Phone: [phone]  Fax: [phone]  Follow Up Time:

## 2024-09-04 NOTE — DISCHARGE NOTE PROVIDER - NSDCFUADDAPPT_GEN_ALL_CORE_FT
Follow up with your surgeon in two weeks. Call for appointment.  If you need more pain medication, call your surgeon's office. For medication refills or authorizations, please call [phone]  We recommend that you call and schedule a follow up appointment within 2-4 weeks with your primary care physician for repeat blood work (CBC and BMP) for post hospital discharge follow-up care.  Call your surgeon if you have increased redness/pain/drainage or fever. Return to ER for shortness of breath/calf tenderness.

## 2024-09-04 NOTE — DISCHARGE NOTE PROVIDER - HOSPITAL COURSE
63yMale with history of Left knee OA presenting for L TKA by Dr. Ramirez on 9/4/24. Risk and benefits of surgery were explained to the patient. The patient understood and agreed to proceed with surgery. Patient underwent the procedure with no intraoperative complications. Pt was brought in stable condition to the PACU. Once stable in PACU, pt was brought to the floor. During hospital stay pt was followed by Medicine, physical therapy, Home Care during this admission. Pt is stable for discharge 63yMale with history of Left knee OA presenting for L TKA by Dr. Ramirez on 9/4/24. Risk and benefits of surgery were explained to the patient. The patient understood and agreed to proceed with surgery. Patient underwent the procedure with no intraoperative complications. Pt was brought in stable condition to the PACU. Once stable in PACU, pt was brought to the floor. During hospital stay pt was followed by Medicine, physical therapy, Home Care during this admission. Pt is stable for discharge home on POD#1.

## 2024-09-04 NOTE — PHYSICAL THERAPY INITIAL EVALUATION ADULT - GAIT TRAINING, PT EVAL
Problem: Mobility Impaired (Adult and Pediatric) Goal: *Acute Goals and Plan of Care (Insert Text) Description: Physical Therapy Goals Initiated 12/25/2020 and to be accomplished within 7 day(s) 1. Patient will move from supine to sit and sit to supine  in bed with modified independence. 2.  Patient will transfer from bed to chair and chair to bed with modified independence using the least restrictive device. 3.  Patient will perform sit to stand with supervision/set-up. 4.  Patient will ambulate with supervision/set-up for 50 feet with the least restrictive device. Note: PHYSICAL THERAPY TREATMENT Patient: Maine Conner (45 y.o. 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 ASSESSMENT: 
Upon entering pt on phone with dietary ordering lunch with no difficulty. Upon attempting to initiate PT pt became and SOB. Despite encouragement and education pt refused to attempt sitting EOB or OOB to chair. Pt performed supine there ex with fatigue noted and pt initiated several rest breaks during sets. Pt kept eyes closed for most of session. Pt resumed playing video game post session. Pt educated to continue performing HEP throughout the day. Will continue to progress mobility as pt tolerates. Progression toward goals: 
[]      Improving appropriately and progressing toward goals 
[]      Improving slowly and progressing toward goals [x]      Not making progress toward goals PLAN: 
Patient continues to benefit from skilled intervention to address the above impairments. Continue treatment per established plan of care. Discharge Recommendations:  Home Health Further Equipment Recommendations for Discharge:  N/A  
 
SUBJECTIVE:  
Patient stated I just can't today.  OBJECTIVE DATA SUMMARY:  
 Critical Behavior: 
Neurologic State: Alert, Appropriate for age, Eyes open spontaneously Orientation Level: Oriented X4 Cognition: Appropriate decision making, Appropriate for age attention/concentration, Appropriate safety awareness, Follows commands Safety/Judgement: Awareness of environment Therapeutic Exercises:  
Supine there ex: ankle pumps, QS, SLR, hip abd/add, x10 reps ea Pain: 
Pain Scale 1: Numeric (0 - 10) Pain Intensity 1: 3 Pain Location 1: Chest 
Pain Intervention(s) 1: Medication (see MAR) Activity Tolerance:  
Fair Please refer to the flowsheet for vital signs taken during this treatment. After treatment:  
[] Patient left in no apparent distress sitting up in chair 
[x] Patient left in no apparent distress in bed 
[x] Call bell left within reach [x] Nursing notified 
[] Caregiver present 
[] Bed alarm activated Liz Mijares Time Calculation: 12 mins To be able to perform ambulation independently using a rolling walker proper technique using AD, with proper posture and functional distance at home in 2 weeks.

## 2024-09-04 NOTE — PHYSICAL THERAPY INITIAL EVALUATION ADULT - PLANNED THERAPY INTERVENTIONS, PT EVAL
To be able to perform stair negotiation with cane device and 1 hand rails Independently to improve access and exiting from home and access to bedrooms, basement and bathrooms by 2 weeks/balance training/bed mobility training/gait training/stretching/transfer training

## 2024-09-04 NOTE — PHYSICAL THERAPY INITIAL EVALUATION ADULT - ACTIVE RANGE OF MOTION EXAMINATION, REHAB EVAL
L knee ROM flexion 80 degrees, extension - 10 degrees/Right LE Active ROM was WFL (within functional limits)/deficits as listed below

## 2024-09-04 NOTE — ASU PREOP CHECKLIST - AS BP NONINV METHOD
electronic Cartilage Graft Text: The defect edges were debeveled with a #15 scalpel blade.  Given the location of the defect, shape of the defect, the fact the defect involved a full thickness cartilage defect a cartilage graft was deemed most appropriate.  An appropriate donor site was identified, cleansed, and anesthetized. The cartilage graft was then harvested and transferred to the recipient site, oriented appropriately and then sutured into place.  The secondary defect was then repaired using a primary closure.

## 2024-09-04 NOTE — DISCHARGE NOTE PROVIDER - NSDCFUSCHEDAPPT_GEN_ALL_CORE_FT
[facility]  [address]  Scheduled Appointment: 09/17/2024    Dallas Figueroa  [facility]  [address]  Scheduled Appointment: 11/26/2024

## 2024-09-04 NOTE — PHYSICAL THERAPY INITIAL EVALUATION ADULT - ADDITIONAL COMMENTS
Pt lives with extended family (Who can assist post op) in a private house with a ramp access to enter. Once inside, the pt has a chairlift to a landing and then 2 steps with a R handrail to reach the main floor where the bedroom and bathroom is. The pts bathroom has a shower stall, fixed/retractable shower head, standard toilet seat and no grab bars. The pt ambulates with no device and does not own any device for ambulation. The pt daily pain is a 3/10 at rest and a 7-8/10 with movement. The pt manages the pain with rest and ibuprofen 3x a day. The pt has no recent outpatient PT, no recent falls and buckling of the knees. The pt wears glasses all the time, R handed, drives and has bilateral hearing aids.

## 2024-09-04 NOTE — CONSULT NOTE ADULT - SUBJECTIVE AND OBJECTIVE BOX
SANDRA JEWELL is a 63y Male s/p ROBOTIC ASSISTED LEFT TOTAL KNEE ARTHROPLASTY WITH LUCERO      w/ h/o HTN (hypertension)    HLD (hyperlipidemia)    GERD (gastroesophageal reflux disease)      denies any chest pain shortness of breath palpitation dizziness lightheadedness nausea vomiting fever or chills    History of laparoscopic adjustable gastric banding    H/O gastric sleeve    History of appendectomy    H/O meniscectomy of right knee    History of meniscectomy of left knee        SH: doesnot smoke or drink at this time    penicillin (Hives)    acetaminophen     Tablet .. 1000 milliGRAM(s) Oral every 8 hours  atorvastatin 10 milliGRAM(s) Oral at bedtime  ceFAZolin   IVPB 2000 milliGRAM(s) IV Intermittent every 8 hours  dextrose 5%. 1000 milliLiter(s) IV Continuous <Continuous>  dextrose 5%. 1000 milliLiter(s) IV Continuous <Continuous>  dextrose 50% Injectable 12.5 Gram(s) IV Push once  dextrose 50% Injectable 25 Gram(s) IV Push once  dextrose 50% Injectable 25 Gram(s) IV Push once  dextrose Oral Gel 15 Gram(s) Oral once PRN  glucagon  Injectable 1 milliGRAM(s) IntraMuscular once  HYDROmorphone  Injectable 0.5 milliGRAM(s) IV Push once PRN  insulin lispro (ADMELOG) corrective regimen sliding scale   SubCutaneous Before meals and at bedtime  ketorolac   Injectable 30 milliGRAM(s) IV Push every 8 hours  losartan 100 milliGRAM(s) Oral daily  magnesium hydroxide Suspension 30 milliLiter(s) Oral daily PRN  melatonin 3 milliGRAM(s) Oral at bedtime PRN  multivitamin 1 Tablet(s) Oral daily  ondansetron Injectable 4 milliGRAM(s) IV Push every 6 hours PRN  oxyCODONE    IR 5 milliGRAM(s) Oral every 3 hours PRN  oxyCODONE    IR 15 milliGRAM(s) Oral every 4 hours PRN  oxyCODONE    IR 10 milliGRAM(s) Oral every 3 hours PRN  pantoprazole    Tablet 40 milliGRAM(s) Oral before breakfast  polyethylene glycol 3350 17 Gram(s) Oral at bedtime  senna 2 Tablet(s) Oral at bedtime  sodium chloride 0.9%. 1000 milliLiter(s) IV Continuous <Continuous>    T(C): 36.6 (09-04-24 @ 18:20), Max: 36.7 (09-04-24 @ 10:53)  HR: 99 (09-04-24 @ 18:20) (58 - 99)  BP: 144/84 (09-04-24 @ 18:20) (105/60 - 160/94)  RR: 16 (09-04-24 @ 18:20) (13 - 20)  SpO2: 95% (09-04-24 @ 18:20) (95% - 100%)  HEENT unremarkable  neck no JVD or bruit  heart normal S1 S2 RRR no gallops or rubs  chest clear to auscultation  abd sof nontender non distended +bs  ext no calf tenderness    A/P   DVT PX  pain control  bowel regimen   wound care as per ortho  GI PX  antiemetics prn  incentive spirometer

## 2024-09-04 NOTE — OCCUPATIONAL THERAPY INITIAL EVALUATION ADULT - GENERAL OBSERVATIONS, REHAB EVAL
Pt encountered semi supine in bed, NAD, all lines intact, pt reported pain 4/10 s/p Left TKA, pt agreeable to OT Eval, PT Willaim present.

## 2024-09-04 NOTE — DISCHARGE NOTE PROVIDER - NSDCMRMEDTOKEN_GEN_ALL_CORE_FT
ibuprofen 800 mg oral tablet: 1 tab(s) orally  mupirocin 2% topical ointment: 1 application in each nostril 2 times a day  olmesartan 40 mg oral tablet: 1 tab(s) orally  omeprazole 20 mg oral delayed release tablet: 1 tab(s) orally  Otezla 30 mg oral tablet: 1 tab(s) orally  Ozempic 2 mg/1.5 mL (0.25 mg or 0.5 mg dose) subcutaneous solution: 0.5 milligram(s) subcutaneously  pravastatin 40 mg oral tablet: 1 tab(s) orally   acetaminophen 500 mg oral tablet: 2 tab(s) orally every 8 hours  Aspirin Low Dose 81 mg oral delayed release tablet: 1 tab(s) orally 2 times a day MDD: 2  celecoxib 200 mg oral capsule: 1 cap(s) orally every 12 hours  Multiple Vitamins oral tablet: 1 tab(s) orally once a day  Narcan 4 mg/0.1 mL nasal spray: 4 milligram(s) intranasally once , repeat as necessary.   As needed. For suspected opiate overdose   Follow instructions on packet MDD: 0.2 ml  olmesartan 40 mg oral tablet: 1 tab(s) orally once a day  omeprazole 20 mg oral delayed release tablet: 1 tab(s) orally once a day  Otezla 30 mg oral tablet: 1 tab(s) orally once a day  oxyCODONE 5 mg oral tablet: 1 tab(s) orally every 4 hours as needed for  pain 1 tab for mild/moderate pain, 2 tabs for severe pain MDD: 6  Ozempic 2 mg/1.5 mL (0.25 mg or 0.5 mg dose) subcutaneous solution: 0.5 milligram(s) subcutaneously  polyethylene glycol 3350 oral powder for reconstitution: 17 gram(s) orally once a day (at bedtime)  pravastatin 40 mg oral tablet: 1 tab(s) orally  senna leaf extract oral tablet: 2 tab(s) orally once a day (at bedtime)

## 2024-09-05 ENCOUNTER — TRANSCRIPTION ENCOUNTER (OUTPATIENT)
Age: 64
End: 2024-09-05

## 2024-09-05 VITALS
SYSTOLIC BLOOD PRESSURE: 108 MMHG | OXYGEN SATURATION: 96 % | DIASTOLIC BLOOD PRESSURE: 64 MMHG | RESPIRATION RATE: 18 BRPM | TEMPERATURE: 98 F | HEART RATE: 68 BPM

## 2024-09-05 LAB
ANION GAP SERPL CALC-SCNC: 8 MMOL/L — SIGNIFICANT CHANGE UP (ref 5–17)
BUN SERPL-MCNC: 22 MG/DL — SIGNIFICANT CHANGE UP (ref 7–23)
CALCIUM SERPL-MCNC: 9.1 MG/DL — SIGNIFICANT CHANGE UP (ref 8.5–10.1)
CHLORIDE SERPL-SCNC: 104 MMOL/L — SIGNIFICANT CHANGE UP (ref 96–108)
CO2 SERPL-SCNC: 24 MMOL/L — SIGNIFICANT CHANGE UP (ref 22–31)
CREAT SERPL-MCNC: 1.11 MG/DL — SIGNIFICANT CHANGE UP (ref 0.5–1.3)
EGFR: 75 ML/MIN/1.73M2 — SIGNIFICANT CHANGE UP
GLUCOSE BLDC GLUCOMTR-MCNC: 122 MG/DL — HIGH (ref 70–99)
GLUCOSE BLDC GLUCOMTR-MCNC: 138 MG/DL — HIGH (ref 70–99)
GLUCOSE BLDC GLUCOMTR-MCNC: 161 MG/DL — HIGH (ref 70–99)
GLUCOSE SERPL-MCNC: 133 MG/DL — HIGH (ref 70–99)
HCT VFR BLD CALC: 34.5 % — LOW (ref 39–50)
HGB BLD-MCNC: 11.6 G/DL — LOW (ref 13–17)
MCHC RBC-ENTMCNC: 30.4 PG — SIGNIFICANT CHANGE UP (ref 27–34)
MCHC RBC-ENTMCNC: 33.6 G/DL — SIGNIFICANT CHANGE UP (ref 32–36)
MCV RBC AUTO: 90.6 FL — SIGNIFICANT CHANGE UP (ref 80–100)
NRBC # BLD: 0 /100 WBCS — SIGNIFICANT CHANGE UP (ref 0–0)
PLATELET # BLD AUTO: 283 K/UL — SIGNIFICANT CHANGE UP (ref 150–400)
POTASSIUM SERPL-MCNC: 4.2 MMOL/L — SIGNIFICANT CHANGE UP (ref 3.5–5.3)
POTASSIUM SERPL-SCNC: 4.2 MMOL/L — SIGNIFICANT CHANGE UP (ref 3.5–5.3)
RBC # BLD: 3.81 M/UL — LOW (ref 4.2–5.8)
RBC # FLD: 14 % — SIGNIFICANT CHANGE UP (ref 10.3–14.5)
SODIUM SERPL-SCNC: 136 MMOL/L — SIGNIFICANT CHANGE UP (ref 135–145)
WBC # BLD: 23.16 K/UL — HIGH (ref 3.8–10.5)
WBC # FLD AUTO: 23.16 K/UL — HIGH (ref 3.8–10.5)

## 2024-09-05 RX ORDER — NALOXONE HCL 1 MG/ML
4 VIAL (ML) INJECTION
Qty: 1 | Refills: 0
Start: 2024-09-05 | End: 2024-09-05

## 2024-09-05 RX ORDER — SENNA 187 MG
2 TABLET ORAL
Qty: 0 | Refills: 0 | DISCHARGE
Start: 2024-09-05

## 2024-09-05 RX ORDER — ASPIRIN 81 MG
1 TABLET, DELAYED RELEASE (ENTERIC COATED) ORAL
Qty: 60 | Refills: 0
Start: 2024-09-05 | End: 2024-10-04

## 2024-09-05 RX ORDER — POLYETHYLENE GLYCOL 3350 17 G/17G
17 POWDER, FOR SOLUTION ORAL
Qty: 0 | Refills: 0 | DISCHARGE
Start: 2024-09-05

## 2024-09-05 RX ORDER — ACETAMINOPHEN 325 MG/1
2 TABLET ORAL
Qty: 0 | Refills: 0 | DISCHARGE
Start: 2024-09-05

## 2024-09-05 RX ORDER — CELECOXIB 400 MG/1
1 CAPSULE ORAL
Qty: 60 | Refills: 0
Start: 2024-09-05 | End: 2024-10-04

## 2024-09-05 RX ORDER — OXYCODONE HYDROCHLORIDE 5 MG/1
1 TABLET ORAL
Qty: 42 | Refills: 0
Start: 2024-09-05 | End: 2024-09-11

## 2024-09-05 RX ADMIN — KETOROLAC TROMETHAMINE 30 MILLIGRAM(S): 30 INJECTION, SOLUTION INTRAMUSCULAR at 05:20

## 2024-09-05 RX ADMIN — SODIUM CHLORIDE 120 MILLILITER(S): 9 INJECTION INTRAMUSCULAR; INTRAVENOUS; SUBCUTANEOUS at 04:19

## 2024-09-05 RX ADMIN — LOSARTAN POTASSIUM 100 MILLIGRAM(S): 50 TABLET ORAL at 05:31

## 2024-09-05 RX ADMIN — ACETAMINOPHEN 1000 MILLIGRAM(S): 325 TABLET ORAL at 05:31

## 2024-09-05 RX ADMIN — KETOROLAC TROMETHAMINE 30 MILLIGRAM(S): 30 INJECTION, SOLUTION INTRAMUSCULAR at 12:50

## 2024-09-05 RX ADMIN — KETOROLAC TROMETHAMINE 30 MILLIGRAM(S): 30 INJECTION, SOLUTION INTRAMUSCULAR at 04:19

## 2024-09-05 RX ADMIN — OXYCODONE HYDROCHLORIDE 10 MILLIGRAM(S): 5 TABLET ORAL at 09:17

## 2024-09-05 RX ADMIN — CEFAZOLIN SODIUM 100 MILLIGRAM(S): 2 INJECTION, SOLUTION INTRAVENOUS at 04:19

## 2024-09-05 RX ADMIN — ACETAMINOPHEN 1000 MILLIGRAM(S): 325 TABLET ORAL at 06:30

## 2024-09-05 RX ADMIN — Medication 81 MILLIGRAM(S): at 05:31

## 2024-09-05 RX ADMIN — OXYCODONE HYDROCHLORIDE 10 MILLIGRAM(S): 5 TABLET ORAL at 10:17

## 2024-09-05 RX ADMIN — CELECOXIB 200 MILLIGRAM(S): 400 CAPSULE ORAL at 05:31

## 2024-09-05 RX ADMIN — ONDANSETRON 4 MILLIGRAM(S): 2 INJECTION, SOLUTION INTRAMUSCULAR; INTRAVENOUS at 13:00

## 2024-09-05 RX ADMIN — ACETAMINOPHEN 1000 MILLIGRAM(S): 325 TABLET ORAL at 13:38

## 2024-09-05 RX ADMIN — Medication 102 MILLIGRAM(S): at 05:30

## 2024-09-05 RX ADMIN — OXYCODONE HYDROCHLORIDE 10 MILLIGRAM(S): 5 TABLET ORAL at 02:45

## 2024-09-05 RX ADMIN — ACETAMINOPHEN 1000 MILLIGRAM(S): 325 TABLET ORAL at 14:30

## 2024-09-05 RX ADMIN — OXYCODONE HYDROCHLORIDE 10 MILLIGRAM(S): 5 TABLET ORAL at 01:45

## 2024-09-05 RX ADMIN — Medication 40 MILLIGRAM(S): at 05:31

## 2024-09-05 RX ADMIN — CELECOXIB 200 MILLIGRAM(S): 400 CAPSULE ORAL at 06:30

## 2024-09-05 NOTE — DISCHARGE NOTE NURSING/CASE MANAGEMENT/SOCIAL WORK - NSDCFUADDAPPT_GEN_ALL_CORE_FT
Follow up with your surgeon in two weeks. Call for appointment.  If you need more pain medication, call your surgeon's office. For medication refills or authorizations, please call [phone]  We recommend that you call and schedule a follow up appointment within 2-4 weeks with your primary care physician for repeat blood work (CBC and BMP) for post hospital discharge follow-up care.  Call your surgeon if you have increased redness/pain/drainage or fever. Return to ER for shortness of breath/calf tenderness.

## 2024-09-05 NOTE — PROGRESS NOTE ADULT - SUBJECTIVE AND OBJECTIVE BOX
Patient is 63y y/o Male s/p L TKA POD#0  Patient is seen and examined at bedside.   Pt tolerated procedure well without any intra-op complications.    Pain is controlled.  Denies CP/SOB/Dizziness/N/V/D/HA.     Vital Signs Last 24 Hrs  T(C): 36.6 (04 Sep 2024 17:20), Max: 36.7 (04 Sep 2024 10:53)  T(F): 97.9 (04 Sep 2024 17:20), Max: 98.1 (04 Sep 2024 10:53)  HR: 71 (04 Sep 2024 17:20) (58 - 94)  BP: 120/71 (04 Sep 2024 17:20) (105/60 - 160/94)  BP(mean): --  RR: 16 (04 Sep 2024 17:20) (13 - 20)  SpO2: 95% (04 Sep 2024 17:20) (95% - 100%)    Parameters below as of 04 Sep 2024 17:20  Patient On (Oxygen Delivery Method): room air          PHYSICAL EXAM:  General: A&Ox3 NAD  LLE: Dressing C/D/I with ACE wrap in place. Motor intact + EHL/FHL/TA/GS.  Sensation is grossly intact.  Extremity warm, compartments soft, compressible. No calf tenderness. DP 2+   RLE: Motor intact +EHL/FHL/TA/GS. Sensation is grossly intact. Extremity warm, compartments soft, compressible. No calf tenderness. DP2+    Labs:                          12.2   17.52 )-----------( 251      ( 04 Sep 2024 14:20 )             36.6       09-04    137  |  105  |  16  ----------------------------<  108<H>  4.8   |  29  |  0.93    Ca    9.1      04 Sep 2024 14:20        A/P: Patient is a 63y y/o Male s/p L TKA, POD # 0  -wound care, knee extension/leg elevation, cryocuff, isometric exercises, new medications reviewed with pt  -Pain control/analgesia  -Inc spirometry reviewed with pt, demonstrated competence  -DVT prophylaxis with Venodynes/Aspirin 81 BID  -F/U AM Labs  -PT/OT/WBAT  -prophylactic Antibiotic  -medical consult  -DC planning    
Patient is seen and examined at bedside. Denies CP/SOB/Dizziness/N/V/D/HA. Pain is controlled.    Vital Signs Last 24 Hrs  T(C): 36.3 (05 Sep 2024 09:00), Max: 36.7 (04 Sep 2024 15:20)  T(F): 97.3 (05 Sep 2024 09:00), Max: 98.1 (04 Sep 2024 15:20)  HR: 94 (05 Sep 2024 09:00) (58 - 99)  BP: 119/69 (05 Sep 2024 09:00) (101/63 - 160/94)  BP(mean): --  RR: 18 (05 Sep 2024 09:00) (13 - 20)  SpO2: 95% (05 Sep 2024 09:00) (92% - 100%)    Parameters below as of 05 Sep 2024 05:10  Patient On (Oxygen Delivery Method): room air          PHYSICAL EXAM:  General: NAD  Neuro:  Alert & responsive  HEENT: NCAT, EOMI, conjunctiva clear  abd: soft, NT/ND  Right LE: Motor intact + EHL/FHL/TA/GS.  Sensation is grossly intact.  Extremity warm, compartments soft, compressible. No calf tenderness. DP 2+   Left LE: Prineo dressing C/D/I.  Motor intact +EHL/FHL/TA/GS. Sensation is grossly intact. Extremity warm, compartments soft, compressible. No calf tenderness. DP2+    Labs:                          11.6   23.16 )-----------( 283      ( 05 Sep 2024 07:25 )             34.5       09-05    136  |  104  |  22  ----------------------------<  133<H>  4.2   |  24  |  1.11    Ca    9.1      05 Sep 2024 07:25        A/P: Patient is a 63y y/o Male s/p left TKA, POD # 1  -wound care, knee extension/leg elevation, cryocuff, isometric exercises, new medications reviewed with pt  -Pain control/analgesia reviewed   -Inc spirometry reviewed with pt, demonstrated competence  -DVT prophylaxis with Venodynes/Aspirin 81mg BID  -Pt requires a rolling walker for MRADLs at home  -PT/OT/WBAT  -medical consult reviewed   -DC planning: for home today with home care  -Pt seen in am with DR Ramirez    
SANDRA JEWELL is a 63y Male s/p ROBOTIC ASSISTED LEFT TOTAL KNEE ARTHROPLASTY WITH LUCERO        denies any chest pain shortness of breath palpitation dizziness lightheadedness nausea vomiting fever or chills    T(C): 36.3 (09-05-24 @ 09:00), Max: 36.7 (09-04-24 @ 15:20)  HR: 94 (09-05-24 @ 09:00) (58 - 99)  BP: 119/69 (09-05-24 @ 09:00) (101/63 - 160/94)  RR: 18 (09-05-24 @ 09:00) (13 - 20)  SpO2: 95% (09-05-24 @ 09:00) (92% - 100%)  no jvd/bruit  s1 s2 rrr  cta  s/nt/nd  no calf tend                        11.6   23.16 )-----------( 283      ( 05 Sep 2024 07:25 )             34.5   09-05    136  |  104  |  22  ----------------------------<  133<H>  4.2   |  24  |  1.11    Ca    9.1      05 Sep 2024 07:25        cont dvt px  pain control  bowel regimen  antiemetics  incentive spirometer

## 2024-09-05 NOTE — DISCHARGE NOTE NURSING/CASE MANAGEMENT/SOCIAL WORK - PATIENT PORTAL LINK FT
You can access the FollowMyHealth Patient Portal offered by [facility] by registering at the following website: [email or web address]. By joining [employer]’s FollowMyHealth portal, you will also be able to view your health information using other applications (apps) compatible with our system.

## 2024-09-11 NOTE — BRIEF OPERATIVE NOTE - FIRST ASSIST CATEGORY
Verbal consent was acquired by the patient to use [employer] ambient listening note generation during this visit   Subjective:   Nicole Patterson is a 42 y.o. female who presents for UTI (Hurt to go, blood in urine, urge to everytime. Bladder and stomach intense pain. X1 day)      HPI:  History of Present Illness  The patient is a 42-year-old female who presents for evaluation of a urinary tract infection (UTI).    She reports pain with urination, followed by frequent urination and the presence of blood in her urine. She also experiences tenderness in the suprapubic region. These symptoms began this morning around 5:30 AM and have since escalated to the point where she is unable to continue her work from home.    She has no prior history of recurrent UTIs. She reports no back pain or symptoms such as fever, chills, or body aches.       Review of Systems   Constitutional:  Negative for chills, fever and malaise/fatigue.   Genitourinary:  Positive for dysuria, frequency and hematuria.        +suprapubic tenderness       Medications:    Current Outpatient Medications on File Prior to Visit   Medication Sig Dispense Refill    Nirmatrelvir&Ritonavir 300/100 20 x 150 MG & 10 x 100MG Tablet Therapy Pack Take 300 mg nirmatrelvir (two 150 mg tablets) with 100 mg ritonavir (one 100 mg tablet) by mouth, with all three tablets taken together twice daily for 5 days. (Patient not taking: Reported on 9/11/2024) 30 Each 0    metroNIDAZOLE (FLAGYL) 500 MG Tab  (Patient not taking: Reported on 9/11/2024)      promethazine-dextromethorphan (PROMETHAZINE-DM) 6.25-15 MG/5ML syrup Take 5 mL by mouth every 6 hours as needed for Cough for up to 20 doses. (Patient not taking: Reported on 9/11/2024) 100 mL 0    DULoxetine (CYMBALTA) 30 MG Cap DR Particles Take 30 mg by mouth every day. (Patient not taking: Reported on 9/11/2024)      amLODIPine (NORVASC) 2.5 MG Tab  (Patient not taking: Reported on 9/11/2024)      Taurine 1000 MG Cap Take   by mouth. (Patient not taking: Reported on 2024)      MAGNESIUM PO Take 400 mg by mouth. (Patient not taking: Reported on 2024)      Cyanocobalamin (B-12) 100 MCG Tab Take  by mouth. (Patient not taking: Reported on 2024)      orphenadrine (NORFLEX) 100 MG tablet Take 300 mg by mouth 2 times a day. (Patient not taking: Reported on 2022)      famotidine (PEPCID) 20 MG Tab Take 20 mg by mouth 2 times a day. (Patient not taking: Reported on 2024)      hydroxychloroquine (PLAQUENIL) 200 MG Tab Take 200 mg by mouth 2 times a day. (Patient not taking: Reported on 2024)      vitamin D (CHOLECALCIFEROL) 1000 UNIT Tab Take 1,000 Units by mouth every day. (Patient not taking: Reported on 2024)      gabapentin (NEURONTIN) 300 MG Cap Take 300 mg by mouth 3 times a day. (Patient not taking: Reported on 2022)       No current facility-administered medications on file prior to visit.        Allergies:   Bee, Morphine, Other misc, and Wellbutrin [bupropion]    Problem List:   Patient Active Problem List   Diagnosis    Fibromyalgia    Preventative health care    Obesity (BMI 30-39.9)    Need for Tdap vaccination    Connective tissue disease, undifferentiated (HCC)    Eosinophilic esophagitis    Vitamin D deficiency    Decreased hearing of both ears    Anxiety        Surgical History:  Past Surgical History:   Procedure Laterality Date    PRASANNA BY LAPAROSCOPY N/A 2017    Procedure: PRASANNA BY LAPAROSCOPY;  Surgeon: Rob Hanson M.D.;  Location: SURGERY [facility];  Service:     PLASTIC SURGERY      breast reduction    PRIMARY C SECTION         Past Social Hx:   Social History     Tobacco Use    Smoking status: Former     Current packs/day: 0.00     Types: Cigarettes     Start date: 6/3/1994     Quit date: 6/3/2008     Years since quittin.2    Smokeless tobacco: Never   Vaping Use    Vaping status: Every Day    Substances: Nicotine    Devices: Refillable tank   Substance Use  "Topics    Alcohol use: No     Alcohol/week: 0.0 oz    Drug use: No          Problem list, medications, and allergies reviewed by myself today in Epic.     Objective:     /78 (BP Location: Left arm, Patient Position: Sitting, BP Cuff Size: Adult)   Pulse 94   Temp 36.5 °C (97.7 °F)   Resp 16   Ht 1.676 m (5' 6\")   Wt 107 kg (235 lb 9.6 oz)   SpO2 97%   BMI 38.03 kg/m²     Physical Exam  Vitals and nursing note reviewed.   Constitutional:       General: She is not in acute distress.     Appearance: Normal appearance. She is normal weight. She is not ill-appearing, toxic-appearing or diaphoretic.   HENT:      Head: Normocephalic and atraumatic.   Cardiovascular:      Rate and Rhythm: Normal rate and regular rhythm.      Pulses: Normal pulses.      Heart sounds: Normal heart sounds. No murmur heard.     No friction rub. No gallop.   Pulmonary:      Effort: Pulmonary effort is normal. No respiratory distress.      Breath sounds: Normal breath sounds. No stridor. No wheezing, rhonchi or rales.   Chest:      Chest wall: No tenderness.   Abdominal:      Tenderness: There is no right CVA tenderness or left CVA tenderness.   Musculoskeletal:      Cervical back: Neck supple. No tenderness.   Lymphadenopathy:      Cervical: No cervical adenopathy.   Skin:     General: Skin is warm and dry.      Capillary Refill: Capillary refill takes less than 2 seconds.   Neurological:      General: No focal deficit present.      Mental Status: She is alert and oriented to person, place, and time. Mental status is at baseline.      Cranial Nerves: No cranial nerve deficit.      Gait: Gait normal.   Psychiatric:         Mood and Affect: Mood normal.         Behavior: Behavior normal.         Thought Content: Thought content normal.         Judgment: Judgment normal.         Assessment/Plan:     Diagnosis and associated orders:   1. UTI symptoms  - POCT Urinalysis  - nitrofurantoin (MACROBID) 100 MG Cap; Take 1 Capsule by mouth 2 " times a day for 5 days.  Dispense: 10 Capsule; Refill: 0  - URINE CULTURE(NEW); Future  - phenazopyridine (PYRIDIUM) 100 MG Tab; Take 1 Tablet by mouth 3 times a day as needed for Moderate Pain for up to 2 days.  Dispense: 6 Tablet; Refill: 0    2. Acute cystitis with hematuria  - nitrofurantoin (MACROBID) 100 MG Cap; Take 1 Capsule by mouth 2 times a day for 5 days.  Dispense: 10 Capsule; Refill: 0  - URINE CULTURE(NEW); Future  - phenazopyridine (PYRIDIUM) 100 MG Tab; Take 1 Tablet by mouth 3 times a day as needed for Moderate Pain for up to 2 days.  Dispense: 6 Tablet; Refill: 0    Results for orders placed or performed in visit on 09/11/24   POCT Urinalysis   Result Value Ref Range    POC Color Red Negative    POC Appearance Cloudy Negative    POC Glucose Neg Negative mg/dL    POC Bilirubin Neg Negative mg/dL    POC Ketones Neg Negative mg/dL    POC Specific Gravity 1.010 <1.005 - >1.030    POC Blood Large Negative    POC Urine PH 6.0 5.0 - 8.0    POC Protein 100 Negative mg/dL    POC Urobiligen 0.2 Negative (0.2) mg/dL    POC Nitrites Positive Negative    POC Leukocyte Esterase Large Negative       Comments/MDM:   Pt is clinically stable at today's acute urgent care visit.  No acute distress noted. Appropriate for outpatient management at this time.     Assessment & Plan  1. Urinary Tract Infection (UTI).  Her symptoms of frequency, blood in the urine, pain with urination, and suprapubic tenderness are indicative of a urinary tract infection. POC UA is consistent with UTI.  She has no history of recurrent UTIs and no back pain, fever, or chills. Nitrofurantoin has been prescribed to treat the infection. Pyridium has also been prescribed to manage the pain. The urine sample will be sent for culture to ensure the appropriateness of the antibiotic. She is advised to increase her fluid intake, particularly water. Patient is to return to  or go to ER for any new or worsening signs or symptoms, and follow with with  PCP for recheck. Patient is agreeable with plan of care and verbalizes good understanding.  .                Discussed DDx, management options (risks,benefits, and alternatives to planned treatment), natural progression and supportive care.  Expressed understanding and the treatment plan was agreed upon. Questions were encouraged and answered   Return to urgent care prn if new or worsening sx or if there is no improvement in condition prn.    Educated in Red flags and indications to immediately call 911 or present to the Emergency Department.   Advised the patient to follow-up with the primary care physician for recheck, reevaluation, and consideration of further management.    I personally reviewed prior external notes and test results pertinent to today's visit.  I have independently reviewed and interpreted all diagnostics ordered during this urgent care acute visit.       Please note that this dictation was created using voice recognition software. I have made a reasonable attempt to correct obvious errors, but I expect that there are errors of grammar and possibly content that I did not discover before finalizing the note.    This note was electronically signed by NERI Rodriguez     No qualified resident/fellow available to assist

## 2024-09-12 DIAGNOSIS — M17.12 UNILATERAL PRIMARY OSTEOARTHRITIS, LEFT KNEE: ICD-10-CM

## 2024-09-12 DIAGNOSIS — Z98.84 BARIATRIC SURGERY STATUS: ICD-10-CM

## 2024-09-12 DIAGNOSIS — I10 ESSENTIAL (PRIMARY) HYPERTENSION: ICD-10-CM

## 2024-09-12 DIAGNOSIS — K21.9 GASTRO-ESOPHAGEAL REFLUX DISEASE WITHOUT ESOPHAGITIS: ICD-10-CM

## 2024-09-12 DIAGNOSIS — E66.01 MORBID (SEVERE) OBESITY DUE TO EXCESS CALORIES: ICD-10-CM

## 2024-09-12 DIAGNOSIS — E11.9 TYPE 2 DIABETES MELLITUS WITHOUT COMPLICATIONS: ICD-10-CM

## 2024-09-12 DIAGNOSIS — E78.5 HYPERLIPIDEMIA, UNSPECIFIED: ICD-10-CM

## 2024-09-12 DIAGNOSIS — L40.9 PSORIASIS, UNSPECIFIED: ICD-10-CM

## 2024-09-14 ENCOUNTER — NON-APPOINTMENT (OUTPATIENT)
Age: 64
End: 2024-09-14

## 2024-09-17 ENCOUNTER — APPOINTMENT (OUTPATIENT)
Dept: ORTHOPEDIC SURGERY | Facility: CLINIC | Age: 64
End: 2024-09-17
Payer: OTHER MISCELLANEOUS

## 2024-09-17 ENCOUNTER — RESULT CHARGE (OUTPATIENT)
Age: 64
End: 2024-09-17

## 2024-09-17 VITALS — WEIGHT: 312 LBS | BODY MASS INDEX: 48.97 KG/M2 | HEIGHT: 67 IN

## 2024-09-17 DIAGNOSIS — M17.12 UNILATERAL PRIMARY OSTEOARTHRITIS, LEFT KNEE: ICD-10-CM

## 2024-09-17 DIAGNOSIS — Z96.652 PRESENCE OF LEFT ARTIFICIAL KNEE JOINT: ICD-10-CM

## 2024-09-17 DIAGNOSIS — S92.514A NONDISPLACED FRACTURE OF PROXIMAL PHALANX OF RIGHT LESSER TOE(S), INITIAL ENCOUNTER FOR CLOSED FRACTURE: ICD-10-CM

## 2024-09-17 PROCEDURE — 73630 X-RAY EXAM OF FOOT: CPT | Mod: RT

## 2024-09-17 PROCEDURE — 73562 X-RAY EXAM OF KNEE 3: CPT | Mod: LT

## 2024-09-17 PROCEDURE — 99213 OFFICE O/P EST LOW 20 MIN: CPT | Mod: ACP

## 2024-09-17 PROCEDURE — 99024 POSTOP FOLLOW-UP VISIT: CPT

## 2024-09-17 NOTE — HISTORY OF PRESENT ILLNESS
[6] : 6 [de-identified] : 9/17/24: 2 weeks s/p left TKA pain well controlled, rarely takes oxy.  No fevers/chills.  Stubbed right 4th toe on table 4 days ago getting off the couch.  Previous doc: WC 2/27/13. Left knee pain for several years, fell at work sustaining meniscus tear. Prior to this had scope in 2007 with some relief. Over the years progressive worsening pain. had success in the past with HA and cortisone injections but this past year had less relief.. Cortisone inj 2 weeks ago only helped a few days. Lap band 2003 with 40 lb weight loss. Mult diets in the past always with  lb loss but then gains back. 4/30/19: Continued pain, started seeing bariatric surgeon and is on meal replacement plan. Down to 334 lbs. 5/28/19: Cont pain - lost another 10 lbs since last visit. Seeing bariatrics - may need 2 stage procedure to remove band and then get sleeve. 6/25/19: Cont pain - had to stop NSAIDs because of upcoming bariatric surgery. Taking Tylenol currently with very little relief. Surgery tentatively scheduled 7/24. 9/24/19: Cont pain. Had bariatic surgery in July. 12/9/19 Orthovisc #2 right knee. 12/17/19: Worsening pain - down to 287 lbs. 1/14/20: Cont pain, no change in weight. 2/11/20: TKA was denied, has hearing in 2 weeks. No change in weight recently. 5/13/20: Patient continues to having worsening pain. Has difficulty sleeping. His pain affects his daily life. Reports no change in weight since last visit. Would like a refill on his Duexis. Patient is not currently working due to COVID-19.  8/11/20: Cont pain, awaiting auth for TKA but deposition was rescheduled. now for 8/31? 9/22/20: No change - waiting for TKA auth. 11/3/20: Cont pain, TKA was authorized but struggling with weight loss. 12/15/20: Worsening left knee pain. Has been unsuccessful with weight loss. Started Ozympic this week. 3/16/21: Cont knee pain. Cortisone inj in Dec helped for 1-2 weeks. 4/16/21: Orthovisc #1 left knee. 4/27/21: Orthovisc #2 left knee. 5/4/21: Orthovisc #3 left knee. 5/11/21: Orthovisc #4 left knee. 6/8/21: Since last injection, he has had dull pain in left knee and does not have any clicking in left knee. Has been able to sleep at night and walk smoothly. knee still occ gives out on him  7/20 cont to have sig pain but inj helped - all medial pain --  10/19/21: f/u LT knee, symptoms persist. No significant change. HA in the past with relief. 12/21/21: No change in symptoms from last visit. 1/18/22: Cont pain, no auth yet for HA injections. 3/1/22: Cont pain. 3/15/22: Orthovisc #1 left knee. 4/5/22: Orthovisc #4 left knee, some improvement overall. 5/17/22: HA injections helped but still has pain. 9/20/22: Had about 4 months relief from CSI   10/18/22: Cortisone gave 1 week relief then pain returned. 11/15/22: Orthovisc L KNEE #1 11/22/22: Orthovisc #2 left knee. 11/29/22: Orthovisc #3 left knee. 12/6/22: Orthovisc #4 left knee. 01/03/23: HA injections helped but still has pain. 3/14/23: Worsening pain recently. 6/16/23: Cortisone helped for a couple of weeks. 7/21/23: Here for Orthovisc #1 left knee. 7/25/23: Orthovisc #2 left knee. 8//23: Orthovisc #3 left knee. 8/8/23: Orthovisc #4 left knee. 8/29/23: HA helped but still a lot of pain. 11/28/23: Cortisone inj helped for about a month then pain returned. 2/27/24: Cont pain and occasional locking in left knee. 5/28/24: f/up L knee. Pain continues to gradually worsen. Gel inj had been denied. Would like to discuss TKA today 7/19/24: Cont pain, steadily losing weight with ozempic.  Scheduled for left TKA 9/4/24. [] : no [FreeTextEntry5] : was denied for gel injection. wants to discuss surgery.  [de-identified] : hep  [de-identified] : 09/04/24 [de-identified] : LT TKA LUCERO

## 2024-09-17 NOTE — PHYSICAL EXAM
[Left] : left knee [AP] : anteroposterior [Lateral] : lateral [[place]] : skyline [Components well fixed, in good position] : Components well fixed, in good position [de-identified] : Left knee: Inc c/d/i.  ROM 3-90.  Lig stable.  Walker.  Right foot: Echymosis 4th toe.  1+ edema.  Tenderness 4th prox phalanx.  NVI. [de-identified] : Right 4th prox phalanx fx nondisplaced.

## 2024-09-17 NOTE — DISCUSSION/SUMMARY
[de-identified] : The patient is doing well at this time. The patient will be started on a course of physical therapy. I recommended that the patient works on range of motion at home and was shown how to do this. I encouraged the patient to increase ambulation. The patient can continue to take Tylenol for occasional discomfort. The patient was advised not to do any dental work for the first three months following the surgery. We will see the patient  back for a follow-up for a repeat evaluation. The patient  will call or return earlier for any questions or concerns.  Signs and symptoms of infection reviewed and patient advised to call immediately for redness, fevers, and/or chills.

## 2024-09-17 NOTE — ASSESSMENT
[FreeTextEntry1] : Previous doc: Adv OA left knee. Failed conservative treatments. BMI 53 today (340 lbs) - needs to be < 300 lbs (BMI < 40 would be 250). Had lap band in 2003 and is open to exploring gastric sleeve. 4/30/19: Continued pain - working on getting date for bariatrics. For now cont duexis prn and will try tramadol. Cortisone inj in the past have only helped for a few days at a time. 5/28/19: Doing well with weight loss and planning for removal of gastric band and may need 2 stage for sleeve. 6/25/19: Stable for now - planning for bariatrics end of next month - he will be unable to work for a while due to this. 9/24/19: Cont pain - working on weight loss (293 lbs today). Injections in the past only a few days relief so will hold on this. 12/17/19: Worsening pain. Weight down to 280s now and BMI is < 45 - will get auth for TKA. Has psoriasis but never affecting left knee. 1/14/20: Cont difficulty and waiting for left TKA auth. 2/11/20: No change - TKA was denied but has appeal hearing in 2 weeks. Cont weight loss. 5/13/20: continues to work on weight loss. His Pain and loss of of function continues to progress. He has to take pain medication on a daily basis due how severe his pain is. At this time i believe the benefits outweigh the risks. He will continue on weight loss and our goal of TKA. Refilled his duexis. 8/11/20: No change - has deposition scheduled in Aug. 9/22/20: No change - currently 330 lbs and discussed need to be < 300 lbs for TKA. Awaiting auth. At one point was 280 lbs but gained some back during pandemic. 11/3/20: TKA authorized but unable to proceed as he is struggling with weight loss. BMI too high to proceed with surgery and needs to be < 300, ideally 280s to have TKA. 12/15/20: Continued pain - No change in weight. Will continue to work on weight loss and continue with home exercises. Cortisone injection today. 3/16/21: Still with significant pain - weight loss has not progressed and he has been around 345 the past few months - no overall loss from bariatrics. Not a TKA candidate at this time due to weight. Will try orthovisc again. 4/16/21: Inj tolerated well - asp 5cc. 4/27/21: Inj tolerated well. 5/3/21: Inj tolerated well. 5/11/21: Inj tolerated well. 6/8/21 he has slow wt loss down to 337 - dealing with neuropathy and DM -some relief with Inj and he is concerned he may be disabled 7/20/21 he is unable to retrun to work due to pain but did have some relief with Knee inj - cont o struggle with wt loss 10/19/21: Symptoms unchanged, he has responded to orthovisc and will make another request as he can restart series early-mid November 2021. Weight loss discussed. He will continue OOW. 12/21/21: No change in symptoms. Will discuss "granted w prejudice" with WC as clarification is needed of what this means. f/up in 4 weeks. He will continue OOW 1/18/22: No change - will start HA injections when aurthorized. Only has granted w prejudice at this time. 3/1/22: No auth required to start orthovisc - he is going away next week and would like to start the series when he returns. 3/15/22: Inj tolerated well. 4/5/22: Inj tolerated well, reeval in 6 weeks. 5/17/22: HA injections helped but still with significant pain levels - cortisone inj today tolerated well. He is very frustrated with this situation and that he has had such difficulty with weight loss. 9/20/22: Repeat cortisone inj today tolreated well, will reeval in 1 month for possible HA injections again. 10/18/22: Cortisone only 1 week relief - request auth for orthovisc as this helped him earlier this year. Not a surgical candidate and pain limits his ability to return to work. 11/15/22: Inj tolerated well. 11/22/22: Inj tolerated well. 11/29/22: Inj tolerated well. 12/6/22: Inj tolerated well. 01/03/23: Doing ok since injections. Cont HEP, weight loss. 3/14/23: Worsening pain - cortisone inj today tolerated well. 6/16/23: Worsening pain - cortisone inj today and will get auth for orthovisc left knee. 7/21/23: Orthovisc #1 L knee today, tolerated well 7/25/23: Inj tolerated well. 8/1/23: Inj tolerated well. 8/8/23: Inj tolerated well. 8/29/23: Cortisone inj today. HA helped a little. 11/28/23: Cortisone helped for a few weeks, still has pain but tolerable for now.  Declined inj today, interested in trying HA injections again in Feb. 2/27/24: Cont pain, will get auth for orthovisc left knee as this has been working for him.  5/28/24: Adv L knee OA. Gel injections were denied. Discussed weight loss- he is currently on Ozempic and BMI of 48.87 today (down from 51.69). Most recent A1C was 5.9. He also has hx of psorasis, but no active plaques on his knees. Will request auth for L TKA LUCERO. Will order CT once auth is obtained. f/up 6 weeks.  7/19/24: Planning for left TKA in September.  Needs CT scan.  All questions answered.  9/17/24: 2 weeks postop doing well, cont PT.  Right 4th prox phalanx fx, stubbed toe while walking to bathroom 4 days ago - WBAT, discussed jose miguel taping, return in 4 weeks for new XR.

## 2024-09-23 NOTE — REASON FOR VISIT
Pt identified x2 pt identifiers. 3 YO female presents today via private vehicle from home with c/o rash around her mouth. Mother reports rash started this morning. Mother believes it may be hand foot and mouth.     Pt is alert speaking in full clear sentences, respirations even and unlabored. Skin warm, dry, intact, appropriate for ethnicity. Raised rash noted around mouth.     Mother updated on plan of care. Mother verbalized understanding to inform RN of any changes in symptoms.    
[FreeTextEntry2] : Injection #2 left knee

## 2024-10-16 ENCOUNTER — NON-APPOINTMENT (OUTPATIENT)
Age: 64
End: 2024-10-16

## 2024-11-18 ENCOUNTER — NON-APPOINTMENT (OUTPATIENT)
Age: 64
End: 2024-11-18

## 2024-11-25 ENCOUNTER — APPOINTMENT (OUTPATIENT)
Dept: ORTHOPEDIC SURGERY | Facility: CLINIC | Age: 64
End: 2024-11-25
Payer: OTHER MISCELLANEOUS

## 2024-11-25 VITALS — HEIGHT: 67 IN | BODY MASS INDEX: 48.97 KG/M2 | WEIGHT: 312 LBS

## 2024-11-25 DIAGNOSIS — M17.31 UNILATERAL POST-TRAUMATIC OSTEOARTHRITIS, RIGHT KNEE: ICD-10-CM

## 2024-11-25 PROCEDURE — 99214 OFFICE O/P EST MOD 30 MIN: CPT

## 2024-11-26 ENCOUNTER — APPOINTMENT (OUTPATIENT)
Dept: ORTHOPEDIC SURGERY | Facility: CLINIC | Age: 64
End: 2024-11-26
Payer: OTHER MISCELLANEOUS

## 2024-11-26 ENCOUNTER — RESULT CHARGE (OUTPATIENT)
Age: 64
End: 2024-11-26

## 2024-11-26 DIAGNOSIS — M17.12 UNILATERAL PRIMARY OSTEOARTHRITIS, LEFT KNEE: ICD-10-CM

## 2024-11-26 DIAGNOSIS — Z96.652 PRESENCE OF LEFT ARTIFICIAL KNEE JOINT: ICD-10-CM

## 2024-11-26 PROCEDURE — 73562 X-RAY EXAM OF KNEE 3: CPT | Mod: LT

## 2024-11-26 PROCEDURE — 99024 POSTOP FOLLOW-UP VISIT: CPT

## 2024-12-26 ENCOUNTER — APPOINTMENT (OUTPATIENT)
Dept: ORTHOPEDIC SURGERY | Facility: CLINIC | Age: 64
End: 2024-12-26

## 2025-01-08 ENCOUNTER — APPOINTMENT (OUTPATIENT)
Dept: ORTHOPEDIC SURGERY | Facility: CLINIC | Age: 65
End: 2025-01-08
Payer: OTHER MISCELLANEOUS

## 2025-01-08 VITALS — BODY MASS INDEX: 48.97 KG/M2 | WEIGHT: 312 LBS | HEIGHT: 67 IN

## 2025-01-08 PROCEDURE — 99213 OFFICE O/P EST LOW 20 MIN: CPT

## 2025-01-14 ENCOUNTER — APPOINTMENT (OUTPATIENT)
Dept: ORTHOPEDIC SURGERY | Facility: CLINIC | Age: 65
End: 2025-01-14
Payer: OTHER MISCELLANEOUS

## 2025-01-14 VITALS — HEIGHT: 67 IN | WEIGHT: 300 LBS | BODY MASS INDEX: 47.09 KG/M2

## 2025-01-14 DIAGNOSIS — M17.12 UNILATERAL PRIMARY OSTEOARTHRITIS, LEFT KNEE: ICD-10-CM

## 2025-01-14 DIAGNOSIS — Z96.652 PRESENCE OF LEFT ARTIFICIAL KNEE JOINT: ICD-10-CM

## 2025-01-14 PROCEDURE — 99213 OFFICE O/P EST LOW 20 MIN: CPT

## 2025-01-16 ENCOUNTER — APPOINTMENT (OUTPATIENT)
Dept: ORTHOPEDIC SURGERY | Facility: CLINIC | Age: 65
End: 2025-01-16
Payer: OTHER MISCELLANEOUS

## 2025-01-16 PROCEDURE — 99214 OFFICE O/P EST MOD 30 MIN: CPT | Mod: 25

## 2025-01-16 PROCEDURE — 20611 DRAIN/INJ JOINT/BURSA W/US: CPT | Mod: RT

## 2025-01-23 ENCOUNTER — APPOINTMENT (OUTPATIENT)
Dept: ORTHOPEDIC SURGERY | Facility: CLINIC | Age: 65
End: 2025-01-23
Payer: OTHER MISCELLANEOUS

## 2025-01-23 PROCEDURE — 20611 DRAIN/INJ JOINT/BURSA W/US: CPT | Mod: RT

## 2025-01-23 PROCEDURE — 99213 OFFICE O/P EST LOW 20 MIN: CPT | Mod: 25

## 2025-01-30 ENCOUNTER — APPOINTMENT (OUTPATIENT)
Dept: ORTHOPEDIC SURGERY | Facility: CLINIC | Age: 65
End: 2025-01-30
Payer: OTHER MISCELLANEOUS

## 2025-01-30 VITALS — WEIGHT: 300 LBS | BODY MASS INDEX: 47.09 KG/M2 | HEIGHT: 67 IN

## 2025-01-30 PROCEDURE — 20611 DRAIN/INJ JOINT/BURSA W/US: CPT | Mod: RT

## 2025-01-30 PROCEDURE — 99213 OFFICE O/P EST LOW 20 MIN: CPT | Mod: 25

## 2025-02-05 ENCOUNTER — APPOINTMENT (OUTPATIENT)
Dept: ORTHOPEDIC SURGERY | Facility: CLINIC | Age: 65
End: 2025-02-05

## 2025-02-06 ENCOUNTER — APPOINTMENT (OUTPATIENT)
Dept: ORTHOPEDIC SURGERY | Facility: CLINIC | Age: 65
End: 2025-02-06
Payer: OTHER MISCELLANEOUS

## 2025-02-06 DIAGNOSIS — M17.31 UNILATERAL POST-TRAUMATIC OSTEOARTHRITIS, RIGHT KNEE: ICD-10-CM

## 2025-02-06 PROCEDURE — 20611 DRAIN/INJ JOINT/BURSA W/US: CPT | Mod: RT

## 2025-02-06 PROCEDURE — 99214 OFFICE O/P EST MOD 30 MIN: CPT | Mod: 25

## 2025-02-25 ENCOUNTER — APPOINTMENT (OUTPATIENT)
Dept: ORTHOPEDIC SURGERY | Facility: CLINIC | Age: 65
End: 2025-02-25
Payer: OTHER MISCELLANEOUS

## 2025-02-25 VITALS — BODY MASS INDEX: 47.09 KG/M2 | HEIGHT: 67 IN | WEIGHT: 300 LBS

## 2025-02-25 DIAGNOSIS — Z96.652 PRESENCE OF LEFT ARTIFICIAL KNEE JOINT: ICD-10-CM

## 2025-02-25 PROCEDURE — 99213 OFFICE O/P EST LOW 20 MIN: CPT | Mod: ACP

## 2025-03-20 ENCOUNTER — APPOINTMENT (OUTPATIENT)
Dept: ORTHOPEDIC SURGERY | Facility: CLINIC | Age: 65
End: 2025-03-20
Payer: OTHER MISCELLANEOUS

## 2025-03-20 VITALS — WEIGHT: 300 LBS | BODY MASS INDEX: 47.09 KG/M2 | HEIGHT: 67 IN

## 2025-03-20 DIAGNOSIS — M17.31 UNILATERAL POST-TRAUMATIC OSTEOARTHRITIS, RIGHT KNEE: ICD-10-CM

## 2025-03-20 PROCEDURE — 99214 OFFICE O/P EST MOD 30 MIN: CPT

## 2025-05-15 ENCOUNTER — APPOINTMENT (OUTPATIENT)
Dept: ORTHOPEDIC SURGERY | Facility: CLINIC | Age: 65
End: 2025-05-15
Payer: OTHER MISCELLANEOUS

## 2025-05-15 DIAGNOSIS — M17.31 UNILATERAL POST-TRAUMATIC OSTEOARTHRITIS, RIGHT KNEE: ICD-10-CM

## 2025-05-15 PROCEDURE — 20611 DRAIN/INJ JOINT/BURSA W/US: CPT | Mod: RT

## 2025-05-15 PROCEDURE — 99203 OFFICE O/P NEW LOW 30 MIN: CPT | Mod: 25

## 2025-05-15 PROCEDURE — J3490M: CUSTOM

## 2025-05-27 ENCOUNTER — RX RENEWAL (OUTPATIENT)
Age: 65
End: 2025-05-27

## 2025-06-02 ENCOUNTER — APPOINTMENT (OUTPATIENT)
Dept: ORTHOPEDIC SURGERY | Facility: CLINIC | Age: 65
End: 2025-06-02
Payer: MEDICARE

## 2025-06-02 VITALS — HEIGHT: 67 IN | BODY MASS INDEX: 47.09 KG/M2 | WEIGHT: 300 LBS

## 2025-06-02 DIAGNOSIS — M19.012 PRIMARY OSTEOARTHRITIS, LEFT SHOULDER: ICD-10-CM

## 2025-06-02 DIAGNOSIS — M19.011 PRIMARY OSTEOARTHRITIS, RIGHT SHOULDER: ICD-10-CM

## 2025-06-02 PROCEDURE — 73030 X-RAY EXAM OF SHOULDER: CPT | Mod: RT

## 2025-06-02 PROCEDURE — 99204 OFFICE O/P NEW MOD 45 MIN: CPT

## 2025-06-02 PROCEDURE — 73010 X-RAY EXAM OF SHOULDER BLADE: CPT | Mod: RT

## 2025-06-02 RX ORDER — SEMAGLUTIDE 2.68 MG/ML
INJECTION, SOLUTION SUBCUTANEOUS
Refills: 0 | Status: ACTIVE | COMMUNITY

## 2025-06-02 RX ORDER — VITS A,C,E/LUTEIN/MINERALS 300MCG-200
TABLET ORAL
Refills: 0 | Status: ACTIVE | COMMUNITY

## 2025-06-02 RX ORDER — CELECOXIB 200 MG/1
200 CAPSULE ORAL TWICE DAILY
Qty: 60 | Refills: 0 | Status: ACTIVE | COMMUNITY
Start: 2025-06-02 | End: 2025-07-02

## 2025-06-02 RX ORDER — MULTIVIT-MIN/FA/LYCOPEN/LUTEIN .4-300-25
TABLET ORAL
Refills: 0 | Status: ACTIVE | COMMUNITY

## 2025-06-02 RX ORDER — APREMILAST 20 MG/1
TABLET, FILM COATED ORAL
Refills: 0 | Status: ACTIVE | COMMUNITY

## 2025-06-02 RX ORDER — PRAVASTATIN SODIUM 80 MG/1
TABLET ORAL
Refills: 0 | Status: ACTIVE | COMMUNITY

## 2025-06-02 RX ORDER — OMEPRAZOLE 10 MG/1
10 CAPSULE, DELAYED RELEASE ORAL
Refills: 0 | Status: ACTIVE | COMMUNITY

## 2025-06-02 RX ORDER — OLMESARTAN MEDOXOMIL 40 MG/1
TABLET, FILM COATED ORAL
Refills: 0 | Status: ACTIVE | COMMUNITY

## 2025-06-02 RX ORDER — EZETIMIBE 10 MG/1
TABLET ORAL
Refills: 0 | Status: ACTIVE | COMMUNITY

## 2025-06-02 RX ORDER — METHYLPREDNISOLONE 4 MG/1
4 TABLET ORAL
Qty: 1 | Refills: 0 | Status: ACTIVE | COMMUNITY
Start: 2025-06-02 | End: 1900-01-01

## 2025-06-03 ENCOUNTER — APPOINTMENT (OUTPATIENT)
Dept: ORTHOPEDIC SURGERY | Facility: CLINIC | Age: 65
End: 2025-06-03
Payer: OTHER MISCELLANEOUS

## 2025-06-03 VITALS — WEIGHT: 300 LBS | HEIGHT: 67 IN | BODY MASS INDEX: 47.09 KG/M2

## 2025-06-03 DIAGNOSIS — M17.12 UNILATERAL PRIMARY OSTEOARTHRITIS, LEFT KNEE: ICD-10-CM

## 2025-06-03 DIAGNOSIS — Z96.652 PRESENCE OF LEFT ARTIFICIAL KNEE JOINT: ICD-10-CM

## 2025-06-03 PROCEDURE — 99213 OFFICE O/P EST LOW 20 MIN: CPT

## 2025-07-02 ENCOUNTER — NON-APPOINTMENT (OUTPATIENT)
Age: 65
End: 2025-07-02

## 2025-07-14 ENCOUNTER — APPOINTMENT (OUTPATIENT)
Dept: ORTHOPEDIC SURGERY | Facility: CLINIC | Age: 65
End: 2025-07-14
Payer: MEDICARE

## 2025-07-14 PROCEDURE — 73030 X-RAY EXAM OF SHOULDER: CPT | Mod: LT

## 2025-07-14 PROCEDURE — 73010 X-RAY EXAM OF SHOULDER BLADE: CPT | Mod: LT

## 2025-07-14 PROCEDURE — 99214 OFFICE O/P EST MOD 30 MIN: CPT

## 2025-07-14 RX ORDER — MELOXICAM 15 MG/1
15 TABLET ORAL
Qty: 30 | Refills: 0 | Status: ACTIVE | COMMUNITY
Start: 2025-07-14 | End: 1900-01-01

## 2025-08-01 ENCOUNTER — NON-APPOINTMENT (OUTPATIENT)
Age: 65
End: 2025-08-01

## 2025-08-20 ENCOUNTER — APPOINTMENT (OUTPATIENT)
Dept: ORTHOPEDIC SURGERY | Facility: CLINIC | Age: 65
End: 2025-08-20
Payer: OTHER MISCELLANEOUS

## 2025-08-20 VITALS — HEIGHT: 67 IN | BODY MASS INDEX: 47.09 KG/M2 | WEIGHT: 300 LBS

## 2025-08-20 DIAGNOSIS — M17.31 UNILATERAL POST-TRAUMATIC OSTEOARTHRITIS, RIGHT KNEE: ICD-10-CM

## 2025-08-20 PROCEDURE — 73564 X-RAY EXAM KNEE 4 OR MORE: CPT | Mod: RT

## 2025-08-20 PROCEDURE — 99214 OFFICE O/P EST MOD 30 MIN: CPT

## 2025-08-25 ENCOUNTER — APPOINTMENT (OUTPATIENT)
Dept: ORTHOPEDIC SURGERY | Facility: CLINIC | Age: 65
End: 2025-08-25
Payer: MEDICARE

## 2025-08-25 VITALS — WEIGHT: 300 LBS | BODY MASS INDEX: 47.09 KG/M2 | HEIGHT: 67 IN

## 2025-08-25 PROCEDURE — 99214 OFFICE O/P EST MOD 30 MIN: CPT

## 2025-08-28 ENCOUNTER — APPOINTMENT (OUTPATIENT)
Dept: CT IMAGING | Facility: CLINIC | Age: 65
End: 2025-08-28
Payer: MEDICARE

## 2025-08-28 PROCEDURE — 73200 CT UPPER EXTREMITY W/O DYE: CPT | Mod: RT

## 2025-09-02 ENCOUNTER — APPOINTMENT (OUTPATIENT)
Dept: ORTHOPEDIC SURGERY | Facility: CLINIC | Age: 65
End: 2025-09-02
Payer: OTHER MISCELLANEOUS

## 2025-09-02 VITALS — HEIGHT: 67 IN | BODY MASS INDEX: 47.09 KG/M2 | WEIGHT: 300 LBS

## 2025-09-02 DIAGNOSIS — Z96.652 PRESENCE OF LEFT ARTIFICIAL KNEE JOINT: ICD-10-CM

## 2025-09-02 DIAGNOSIS — M17.12 UNILATERAL PRIMARY OSTEOARTHRITIS, LEFT KNEE: ICD-10-CM

## 2025-09-02 PROCEDURE — 73562 X-RAY EXAM OF KNEE 3: CPT | Mod: LT

## 2025-09-02 PROCEDURE — 99213 OFFICE O/P EST LOW 20 MIN: CPT

## 2025-09-05 ENCOUNTER — APPOINTMENT (OUTPATIENT)
Dept: ORTHOPEDIC SURGERY | Facility: CLINIC | Age: 65
End: 2025-09-05
Payer: MEDICARE

## 2025-09-05 VITALS — WEIGHT: 300 LBS | BODY MASS INDEX: 47.09 KG/M2 | HEIGHT: 67 IN

## 2025-09-05 DIAGNOSIS — M19.012 PRIMARY OSTEOARTHRITIS, LEFT SHOULDER: ICD-10-CM

## 2025-09-05 DIAGNOSIS — M19.011 PRIMARY OSTEOARTHRITIS, RIGHT SHOULDER: ICD-10-CM

## 2025-09-05 PROCEDURE — 20611 DRAIN/INJ JOINT/BURSA W/US: CPT | Mod: LT

## 2025-09-05 PROCEDURE — 99214 OFFICE O/P EST MOD 30 MIN: CPT | Mod: 25

## 2025-09-05 PROCEDURE — J3490M: CUSTOM | Mod: RT

## (undated) DEVICE — VENODYNE/SCD SLEEVE CALF MEDIUM

## (undated) DEVICE — DRSG COBAN 6"

## (undated) DEVICE — DRSG DERMABOND PRINEO 22CM

## (undated) DEVICE — MEDICATION LABELS W MARKER

## (undated) DEVICE — HOOD FLYTE STRYKER HELMET SHIELD

## (undated) DEVICE — CRYO/CUFF GRAVITY COOLER KNEE LARGE

## (undated) DEVICE — MAKO DRAPE KIT

## (undated) DEVICE — POSITIONER FOAM BUMP FLAT TOP 10X6X4" LRG

## (undated) DEVICE — PACK TOTAL KNEE

## (undated) DEVICE — DRSG ACE BANDAGE 6"

## (undated) DEVICE — GOWN IMPERV BREATHABLE XL

## (undated) DEVICE — MAKO VIZADISC KNEE TRACKING KIT

## (undated) DEVICE — SAW BLADE STRYKER SAGITTAL 25X0.89X75MM

## (undated) DEVICE — SYR LUER LOK 20CC

## (undated) DEVICE — SOL IRR BAG NS 0.9% 3000ML

## (undated) DEVICE — ELCTR STRYKER NEPTUNE SMOKE EVACUATION PENCIL (GREEN)

## (undated) DEVICE — MIXER BONE CEMENT EVAC III

## (undated) DEVICE — GLV 8.5 PROTEXIS (WHITE)

## (undated) DEVICE — MAKO CHECKPOINT KIT FEMORAL / TIBIAL

## (undated) DEVICE — FRA-ESU BOVIE FORCE TRIAD T7J19731DX: Type: DURABLE MEDICAL EQUIPMENT

## (undated) DEVICE — MAKO BLADE NARROW

## (undated) DEVICE — MAKO BLADE STANDARD

## (undated) DEVICE — HOOD T5 PEELAWAY

## (undated) DEVICE — SUT STRATAFIX SPIRAL PDS PLUS 2-0 45CM CT-1

## (undated) DEVICE — TOURNIQUET ESMARK 6"

## (undated) DEVICE — NDL HYPO SAFE 22G X 1.5" (BLACK)

## (undated) DEVICE — DRAPE SURGICAL #1010

## (undated) DEVICE — SUT STRATAFIX SYMMETRIC PDS PLUS 1 18" CTX VIOLET

## (undated) DEVICE — SYR ASEPTO

## (undated) DEVICE — GLV 8.5 PROTEXIS ORTHO (BROWN)

## (undated) DEVICE — WARMING BLANKET UPPER ADULT

## (undated) DEVICE — PREP SCRUB BRUSH W CHG 4%

## (undated) DEVICE — ZIMMER PULSAVAC PLUS FAN KIT

## (undated) DEVICE — SUCTION TIP KAMVAC MINI

## (undated) DEVICE — SUT VICRYL 0 27" CT-1 UNDYED

## (undated) DEVICE — SUT STRATAFIX SPIRAL MONOCRYL PLUS 4-0 45CM PS-2 UNDYED

## (undated) DEVICE — DRSG TELFA 3 X 8